# Patient Record
Sex: FEMALE | Race: BLACK OR AFRICAN AMERICAN | Employment: PART TIME | URBAN - METROPOLITAN AREA
[De-identification: names, ages, dates, MRNs, and addresses within clinical notes are randomized per-mention and may not be internally consistent; named-entity substitution may affect disease eponyms.]

---
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
## 2022-01-13 ENCOUNTER — HOSPITAL ENCOUNTER (OUTPATIENT)
Dept: NUCLEAR MEDICINE | Age: 22
Discharge: HOME | End: 2022-01-13
Attending: INTERNAL MEDICINE
Payer: COMMERCIAL

## 2022-01-13 DIAGNOSIS — R11.2 NAUSEA WITH VOMITING: ICD-10-CM

## 2022-01-13 PROCEDURE — 78264 GASTRIC EMPTYING IMG STUDY: CPT

## 2022-01-13 RX ORDER — TECHNETIUM TC 99M SULFUR COLLOID 2 MG
0.5 KIT MISCELLANEOUS
Status: COMPLETED | OUTPATIENT
Start: 2022-01-13 | End: 2022-01-13

## 2022-01-13 RX ADMIN — TECHNETIUM TC 99M SULFUR COLLOID 0.5 MILLICURIE: KIT at 11:30

## 2022-06-28 PROCEDURE — 93005 ELECTROCARDIOGRAM TRACING: CPT

## 2022-06-28 PROCEDURE — 84484 ASSAY OF TROPONIN QUANT: CPT

## 2022-06-28 PROCEDURE — 36415 COLL VENOUS BLD VENIPUNCTURE: CPT

## 2022-06-28 PROCEDURE — 80053 COMPREHEN METABOLIC PANEL: CPT

## 2022-06-28 PROCEDURE — 85025 COMPLETE CBC W/AUTO DIFF WBC: CPT

## 2022-06-28 PROCEDURE — 99285 EMERGENCY DEPT VISIT HI MDM: CPT

## 2022-06-28 PROCEDURE — 84703 CHORIONIC GONADOTROPIN ASSAY: CPT

## 2022-06-29 ENCOUNTER — APPOINTMENT (OUTPATIENT)
Dept: CT IMAGING | Age: 22
End: 2022-06-29
Attending: EMERGENCY MEDICINE
Payer: COMMERCIAL

## 2022-06-29 ENCOUNTER — HOSPITAL ENCOUNTER (EMERGENCY)
Age: 22
Discharge: HOME | End: 2022-06-29
Attending: EMERGENCY MEDICINE
Payer: COMMERCIAL

## 2022-06-29 ENCOUNTER — APPOINTMENT (OUTPATIENT)
Dept: GENERAL RADIOLOGY | Age: 22
End: 2022-06-29
Attending: EMERGENCY MEDICINE
Payer: COMMERCIAL

## 2022-06-29 VITALS
WEIGHT: 135 LBS | HEART RATE: 61 BPM | HEIGHT: 67 IN | TEMPERATURE: 97.7 F | OXYGEN SATURATION: 97 % | DIASTOLIC BLOOD PRESSURE: 69 MMHG | BODY MASS INDEX: 21.19 KG/M2 | RESPIRATION RATE: 15 BRPM | SYSTOLIC BLOOD PRESSURE: 109 MMHG

## 2022-06-29 DIAGNOSIS — R07.9 ACUTE CHEST PAIN: ICD-10-CM

## 2022-06-29 DIAGNOSIS — R07.89 ATYPICAL CHEST PAIN: Primary | ICD-10-CM

## 2022-06-29 DIAGNOSIS — N39.0 URINARY TRACT INFECTION WITHOUT HEMATURIA, SITE UNSPECIFIED: ICD-10-CM

## 2022-06-29 DIAGNOSIS — M79.18 MUSCULAR ABDOMINAL PAIN IN LEFT FLANK: ICD-10-CM

## 2022-06-29 LAB
ALBUMIN SERPL-MCNC: 4.6 G/DL (ref 3.5–5)
ALBUMIN/GLOB SERPL: 1.2 {RATIO} (ref 1.1–2.2)
ALP SERPL-CCNC: 57 U/L (ref 45–117)
ALT SERPL-CCNC: 12 U/L (ref 12–78)
ANION GAP SERPL CALC-SCNC: 7 MMOL/L (ref 5–15)
APPEARANCE UR: CLEAR
AST SERPL-CCNC: 12 U/L (ref 15–37)
ATRIAL RATE: 87 BPM
BACTERIA URNS QL MICRO: ABNORMAL /HPF
BASOPHILS # BLD: 0 K/UL (ref 0–0.1)
BASOPHILS NFR BLD: 0 % (ref 0–1)
BILIRUB SERPL-MCNC: 0.9 MG/DL (ref 0.2–1)
BILIRUB UR QL: NEGATIVE
BUN SERPL-MCNC: 7 MG/DL (ref 6–20)
BUN/CREAT SERPL: 7 (ref 12–20)
CALCIUM SERPL-MCNC: 9.3 MG/DL (ref 8.5–10.1)
CALCULATED P AXIS, ECG09: 63 DEGREES
CALCULATED R AXIS, ECG10: 81 DEGREES
CALCULATED T AXIS, ECG11: 46 DEGREES
CHLORIDE SERPL-SCNC: 104 MMOL/L (ref 97–108)
CO2 SERPL-SCNC: 27 MMOL/L (ref 21–32)
COLOR UR: ABNORMAL
CREAT SERPL-MCNC: 0.94 MG/DL (ref 0.55–1.02)
DIAGNOSIS, 93000: NORMAL
DIFFERENTIAL METHOD BLD: ABNORMAL
EOSINOPHIL # BLD: 0.1 K/UL (ref 0–0.4)
EOSINOPHIL NFR BLD: 1 % (ref 0–7)
EPITH CASTS URNS QL MICRO: ABNORMAL /LPF
ERYTHROCYTE [DISTWIDTH] IN BLOOD BY AUTOMATED COUNT: 12.5 % (ref 11.5–14.5)
GLOBULIN SER CALC-MCNC: 3.8 G/DL (ref 2–4)
GLUCOSE SERPL-MCNC: 85 MG/DL (ref 65–100)
GLUCOSE UR STRIP.AUTO-MCNC: NEGATIVE MG/DL
HCG SERPL QL: NEGATIVE
HCG UR QL: NEGATIVE
HCT VFR BLD AUTO: 37.1 % (ref 35–47)
HGB BLD-MCNC: 12.6 G/DL (ref 11.5–16)
HGB UR QL STRIP: ABNORMAL
IMM GRANULOCYTES # BLD AUTO: 0 K/UL (ref 0–0.04)
IMM GRANULOCYTES NFR BLD AUTO: 1 % (ref 0–0.5)
KETONES UR QL STRIP.AUTO: NEGATIVE MG/DL
LEUKOCYTE ESTERASE UR QL STRIP.AUTO: ABNORMAL
LYMPHOCYTES # BLD: 1.7 K/UL (ref 0.8–3.5)
LYMPHOCYTES NFR BLD: 21 % (ref 12–49)
MCH RBC QN AUTO: 29 PG (ref 26–34)
MCHC RBC AUTO-ENTMCNC: 34 G/DL (ref 30–36.5)
MCV RBC AUTO: 85.3 FL (ref 80–99)
MONOCYTES # BLD: 0.8 K/UL (ref 0–1)
MONOCYTES NFR BLD: 10 % (ref 5–13)
MUCOUS THREADS URNS QL MICRO: ABNORMAL /LPF
NEUTS SEG # BLD: 5.3 K/UL (ref 1.8–8)
NEUTS SEG NFR BLD: 67 % (ref 32–75)
NITRITE UR QL STRIP.AUTO: NEGATIVE
NRBC # BLD: 0 K/UL (ref 0–0.01)
NRBC BLD-RTO: 0 PER 100 WBC
P-R INTERVAL, ECG05: 130 MS
PH UR STRIP: 5.5 [PH] (ref 5–8)
PLATELET # BLD AUTO: 251 K/UL (ref 150–400)
PMV BLD AUTO: 10.6 FL (ref 8.9–12.9)
POTASSIUM SERPL-SCNC: 3.4 MMOL/L (ref 3.5–5.1)
PROT SERPL-MCNC: 8.4 G/DL (ref 6.4–8.2)
PROT UR STRIP-MCNC: NEGATIVE MG/DL
Q-T INTERVAL, ECG07: 360 MS
QRS DURATION, ECG06: 78 MS
QTC CALCULATION (BEZET), ECG08: 433 MS
RBC # BLD AUTO: 4.35 M/UL (ref 3.8–5.2)
RBC #/AREA URNS HPF: ABNORMAL /HPF (ref 0–5)
SODIUM SERPL-SCNC: 138 MMOL/L (ref 136–145)
SP GR UR REFRACTOMETRY: 1.02
TROPONIN-HIGH SENSITIVITY: <4 NG/L (ref 0–51)
UA: UC IF INDICATED,UAUC: ABNORMAL
UROBILINOGEN UR QL STRIP.AUTO: 1 EU/DL (ref 0.2–1)
VENTRICULAR RATE, ECG03: 87 BPM
WBC # BLD AUTO: 7.8 K/UL (ref 3.6–11)
WBC URNS QL MICRO: ABNORMAL /HPF (ref 0–4)

## 2022-06-29 PROCEDURE — 96375 TX/PRO/DX INJ NEW DRUG ADDON: CPT

## 2022-06-29 PROCEDURE — 74011000636 HC RX REV CODE- 636: Performed by: EMERGENCY MEDICINE

## 2022-06-29 PROCEDURE — 81025 URINE PREGNANCY TEST: CPT

## 2022-06-29 PROCEDURE — 81001 URINALYSIS AUTO W/SCOPE: CPT

## 2022-06-29 PROCEDURE — 71046 X-RAY EXAM CHEST 2 VIEWS: CPT

## 2022-06-29 PROCEDURE — 74177 CT ABD & PELVIS W/CONTRAST: CPT

## 2022-06-29 PROCEDURE — 96374 THER/PROPH/DIAG INJ IV PUSH: CPT

## 2022-06-29 PROCEDURE — 74011250636 HC RX REV CODE- 250/636: Performed by: EMERGENCY MEDICINE

## 2022-06-29 PROCEDURE — 73502 X-RAY EXAM HIP UNI 2-3 VIEWS: CPT

## 2022-06-29 RX ORDER — MORPHINE SULFATE 2 MG/ML
4 INJECTION, SOLUTION INTRAMUSCULAR; INTRAVENOUS
Status: COMPLETED | OUTPATIENT
Start: 2022-06-29 | End: 2022-06-29

## 2022-06-29 RX ORDER — NAPROXEN 500 MG/1
500 TABLET ORAL 2 TIMES DAILY WITH MEALS
Qty: 20 TABLET | Refills: 0 | Status: SHIPPED | OUTPATIENT
Start: 2022-06-29 | End: 2022-08-22

## 2022-06-29 RX ORDER — NITROFURANTOIN 25; 75 MG/1; MG/1
100 CAPSULE ORAL 2 TIMES DAILY
Qty: 10 CAPSULE | Refills: 0 | Status: SHIPPED | OUTPATIENT
Start: 2022-06-29 | End: 2022-07-04

## 2022-06-29 RX ORDER — KETOROLAC TROMETHAMINE 30 MG/ML
30 INJECTION, SOLUTION INTRAMUSCULAR; INTRAVENOUS
Status: COMPLETED | OUTPATIENT
Start: 2022-06-29 | End: 2022-06-29

## 2022-06-29 RX ORDER — METHOCARBAMOL 500 MG/1
500 TABLET, FILM COATED ORAL 3 TIMES DAILY
Qty: 15 TABLET | Refills: 0 | Status: SHIPPED | OUTPATIENT
Start: 2022-06-29 | End: 2022-08-22 | Stop reason: ALTCHOICE

## 2022-06-29 RX ADMIN — KETOROLAC TROMETHAMINE 30 MG: 30 INJECTION, SOLUTION INTRAMUSCULAR at 01:35

## 2022-06-29 RX ADMIN — IOPAMIDOL 100 ML: 755 INJECTION, SOLUTION INTRAVENOUS at 02:55

## 2022-06-29 RX ADMIN — MORPHINE SULFATE 4 MG: 2 INJECTION, SOLUTION INTRAMUSCULAR; INTRAVENOUS at 02:37

## 2022-06-29 NOTE — Clinical Note
[address]  [facility] EMERGENCY DEPT  [id number] [id number] [phone]    Work/School Note    Date: 6/28/2022    To Whom It May concern:    Shannan Zhang was seen and treated today in the emergency room by the following provider(s):  Attending Provider: Michel Pascal MD.      Shannan Zhang is excused from work/school on 6/29/2022 through 7/1/2022. She is medically clear to return to work/school on 7/2/2022.          Sincerely,          Kate Nelson MD

## 2022-06-29 NOTE — DISCHARGE INSTRUCTIONS
Thank You! It was a pleasure taking care of you in our Emergency Department today. We know that when you come to [facility], you are entrusting us with your health, comfort, and safety. Our physicians and nurses honor that trust, and truly appreciate the opportunity to care for you and your loved ones. We also value your feedback. If you receive a survey about your Emergency Department experience today, please fill it out. We care about our patients' feedback, and we listen to what you have to say. Thank you. Dr. Sophie Lord M.D.      ________________________________________________________________________  I have included a copy of your lab results and/or radiologic studies from today's visit so you can have them easily available at your follow-up visit. We hope you feel better and please do not hesitate to contact the ED if you have any questions at all!     Recent Results (from the past 12 hour(s))   EKG, 12 LEAD, INITIAL    Collection Time: 06/28/22 11:40 PM   Result Value Ref Range    Ventricular Rate 87 BPM    Atrial Rate 87 BPM    P-R Interval 130 ms    QRS Duration 78 ms    Q-T Interval 360 ms    QTC Calculation (Bezet) 433 ms    Calculated P Axis 63 degrees    Calculated R Axis 81 degrees    Calculated T Axis 46 degrees    Diagnosis       ** Poor data quality, interpretation may be adversely affected  Normal sinus rhythm with sinus arrhythmia  Normal ECG  When compared with ECG of 02-SEP-2021 17:00,  No significant change was found     CBC WITH AUTOMATED DIFF    Collection Time: 06/28/22 11:47 PM   Result Value Ref Range    WBC 7.8 3.6 - 11.0 K/uL    RBC 4.35 3.80 - 5.20 M/uL    HGB 12.6 11.5 - 16.0 g/dL    HCT 37.1 35.0 - 47.0 %    MCV 85.3 80.0 - 99.0 FL    MCH 29.0 26.0 - 34.0 PG    MCHC 34.0 30.0 - 36.5 g/dL    RDW 12.5 11.5 - 14.5 %    PLATELET 207 352 - 795 K/uL    MPV 10.6 8.9 - 12.9 FL    NRBC 0.0 0  WBC    ABSOLUTE NRBC 0.00 0.00 - 0.01 K/uL NEUTROPHILS 67 32 - 75 %    LYMPHOCYTES 21 12 - 49 %    MONOCYTES 10 5 - 13 %    EOSINOPHILS 1 0 - 7 %    BASOPHILS 0 0 - 1 %    IMMATURE GRANULOCYTES 1 (H) 0.0 - 0.5 %    ABS. NEUTROPHILS 5.3 1.8 - 8.0 K/UL    ABS. LYMPHOCYTES 1.7 0.8 - 3.5 K/UL    ABS. MONOCYTES 0.8 0.0 - 1.0 K/UL    ABS. EOSINOPHILS 0.1 0.0 - 0.4 K/UL    ABS. BASOPHILS 0.0 0.0 - 0.1 K/UL    ABS. IMM. GRANS. 0.0 0.00 - 0.04 K/UL    DF AUTOMATED     METABOLIC PANEL, COMPREHENSIVE    Collection Time: 06/28/22 11:47 PM   Result Value Ref Range    Sodium 138 136 - 145 mmol/L    Potassium 3.4 (L) 3.5 - 5.1 mmol/L    Chloride 104 97 - 108 mmol/L    CO2 27 21 - 32 mmol/L    Anion gap 7 5 - 15 mmol/L    Glucose 85 65 - 100 mg/dL    BUN 7 6 - 20 MG/DL    Creatinine 0.94 0.55 - 1.02 MG/DL    BUN/Creatinine ratio 7 (L) 12 - 20      GFR est AA >60 >60 ml/min/1.73m2    GFR est non-AA >60 >60 ml/min/1.73m2    Calcium 9.3 8.5 - 10.1 MG/DL    Bilirubin, total 0.9 0.2 - 1.0 MG/DL    ALT (SGPT) 12 12 - 78 U/L    AST (SGOT) 12 (L) 15 - 37 U/L    Alk.  phosphatase 57 45 - 117 U/L    Protein, total 8.4 (H) 6.4 - 8.2 g/dL    Albumin 4.6 3.5 - 5.0 g/dL    Globulin 3.8 2.0 - 4.0 g/dL    A-G Ratio 1.2 1.1 - 2.2     TROPONIN-HIGH SENSITIVITY    Collection Time: 06/28/22 11:47 PM   Result Value Ref Range    Troponin-High Sensitivity <4 0 - 51 ng/L   HCG QL SERUM    Collection Time: 06/28/22 11:47 PM   Result Value Ref Range    HCG, Ql. Negative NEG     URINALYSIS W/ REFLEX CULTURE    Collection Time: 06/29/22  1:14 AM    Specimen: Urine   Result Value Ref Range    Color YELLOW/STRAW      Appearance CLEAR CLEAR      Specific gravity 1.017      pH (UA) 5.5 5.0 - 8.0      Protein Negative NEG mg/dL    Glucose Negative NEG mg/dL    Ketone Negative NEG mg/dL    Bilirubin Negative NEG      Blood TRACE (A) NEG      Urobilinogen 1.0 0.2 - 1.0 EU/dL    Nitrites Negative NEG      Leukocyte Esterase SMALL (A) NEG      WBC 0-4 0 - 4 /hpf    RBC 0-5 0 - 5 /hpf    Epithelial cells MANY (A) FEW /lpf    Bacteria 1+ (A) NEG /hpf    UA:UC IF INDICATED CULTURE NOT INDICATED BY UA RESULT CNI      Mucus 1+ (A) NEG /lpf   HCG URINE, QL. - POC    Collection Time: 06/29/22  1:30 AM   Result Value Ref Range    Pregnancy test,urine (POC) Negative NEG         CT ABD PELV W CONT   Final Result   Small amount of free fluid in the pelvis. No bowel obstruction. No   intra-abdominal abscess. XR HIP LT W OR WO PELV 2-3 VWS   Final Result   No evidence of acute fracture or dislocation. XR CHEST PA LAT   Final Result   No acute cardiopulmonary disease. CT Results  (Last 48 hours)                 06/29/22 0254  CT ABD PELV W CONT Final result    Impression:  Small amount of free fluid in the pelvis. No bowel obstruction. No   intra-abdominal abscess. Narrative:  INDICATION: Acute left lower quadrant abdominal pain. CT of the abdomen and pelvis is performed with 5 mm collimation. Study is   performed with 100 cc of nonionic Isovue 370. Sagittal and coronal reformatted   images were also performed. CT dose reduction was achieved with the use of the standardized protocol   tailored for this examination and automatic exposure control for dose   modulation. Direct comparison is made to prior CT dated June 2021. Findings:       Lung bases: The visualized lung bases are clear. Liver: The liver is normal.       Adrenals: Adrenal glands are normal.       Pancreas: The pancreas is normal.       Gallbladder: The gallbladder is normal.       Kidneys: The kidneys are normal.       Spleen: The spleen is normal.       Lymph nodes. There is no saw hepatitis, mesenteric, retroperitoneal or pelvic   lymphadenopathy. Bowel: No thickened or dilated loop of large or small bowel is visualized. Appendix: The appendix is normal.       Urinary bladder: Urinary bladder is partially filled and grossly normal.       Miscellaneous:  There is a small amount of free fluid in the pelvis. There is no   free intraperitoneal gas. There is no focal fluid collection to suggest abscess. The exam and treatment you received in the Emergency Department were for an urgent problem and are not intended as complete care. It is important that you follow up with a doctor, nurse practitioner, or physician assistant for ongoing care. If your symptoms become worse or you do not improve as expected and you are unable to reach your usual health care provider, you should return to the Emergency Department. We are available 24 hours a day. Please take your discharge instructions with you when you go to your follow-up appointment. If a prescription has been provided, please have it filled as soon as possible to prevent a delay in treatment. Read the entire medication instruction sheet provided to you by the pharmacy. If you have any questions or reservations about taking the medication due to side effects or interactions with other medications, please call your primary care physician or contact the ER to speak with the charge nurse. Please make an appointment with your family doctor or the physician you were referred to for follow-up of this visit as instructed on your discharge paperwork. Return to the ER if you are unable to be seen or if you are unable to be seen in a timely manner. If you have any problem arranging the follow-up visit, contact the Emergency Department immediately.

## 2022-06-29 NOTE — ED PROVIDER NOTES
EMERGENCY DEPARTMENT HISTORY AND PHYSICAL EXAM      Please note that this dictation was completed with the assistance of \"Dragon\", the computer voice recognition software. Quite often unanticipated grammatical, syntax, homophones, and other interpretive errors are inadvertently transcribed by the computer software. Please disregard these errors and any errors that have escaped final proofreading. Thank you. Date: 06/29/22  Patient: Lucho Saunders  Patient Age and Sex: 23 y.o. female   MRN: [id number]  CSN: [id number]    History of Presenting Illness     Chief Complaint   Patient presents with    Hip Pain     Patient states she was in a patient's room and experienced L sided hip pain, shooting pain increased and has not improved since onset. Patient states that it is painful to ambulate. Patient experiencing headache and chest pain that began with symptom onset.  Chest Pain     History Provided By: Patient/family/EMS (if applicable)    HPI: Lucho Saunders, 23 y.o. female with past medical history as documented below presents to the ED with c/o of acute onset of mild to moderate left sided chest and hip pain about an hour ago. 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 Reports no other associated sx's. States sx's are sharp, worse with movement and palpation. Pt denies any other exacerbating or ameliorating factors. Additionally, pt specifically denies any recent fever, chills, headache, nausea, vomiting, abdominal pain, SOB, lightheadedness, dizziness, numbness, weakness, lower extremity swelling, heart palpitations, urinary sxs, diarrhea, constipation, melena, hematochezia, cough, or congestion. There are no other complaints, changes or physical findings pertinent to the HPI at this time.     PCP: Milena Miguel MD  Past History   Past Medical History:  Denies    Past Surgical History:  Past Surgical History:   Procedure Laterality Date    HX ORTHOPAEDIC right ACL    HX TYMPANOSTOMY         Family History:   Family history reviewed and was non-contributory, unless specified below:    Social History:  Social History     Tobacco Use    Smoking status: Never Smoker    Smokeless tobacco: Never Used   Substance Use Topics    Alcohol use: Not on file    Drug use: Not on file       Allergies: Allergies   Allergen Reactions    Pcn [Penicillins] Rash       Current Medications:  No current facility-administered medications on file prior to encounter. Current Outpatient Medications on File Prior to Encounter   Medication Sig Dispense Refill    ondansetron (Zofran ODT) 4 mg disintegrating tablet Take 1 Tab by mouth every eight (8) hours as needed for Nausea. 10 Tab 0    HYDROcodone-acetaminophen (NORCO) 5-325 mg per tablet Take 1 Tab by mouth every six (6) hours as needed for Pain. Max Daily Amount: 4 Tabs. 8 Tab 0    ibuprofen (MOTRIN) 600 mg tablet Take 1 Tab by mouth every six (6) hours as needed for Pain. 20 Tab 0    montelukast (SINGULAIR) 5 mg chewable tablet   0    cyclobenzaprine (FLEXERIL) 10 mg tablet   0     Review of Systems   A complete ROS was reviewed by me today and all other systems negative, unless otherwise specified below:  Review of Systems   Constitutional: Negative. Negative for chills and fever. HENT: Negative. Negative for congestion and sore throat. Eyes: Negative. Respiratory: Negative. Negative for cough, chest tightness, shortness of breath and wheezing. Cardiovascular: Positive for chest pain. Negative for palpitations and leg swelling. Gastrointestinal: Negative. Negative for abdominal distention, abdominal pain, blood in stool, constipation, diarrhea, nausea and vomiting. Endocrine: Negative. Genitourinary: Negative. Negative for dysuria, flank pain, frequency, hematuria and urgency. Musculoskeletal: Positive for arthralgias. Negative for back pain and myalgias. Skin: Negative.   Negative for color change and rash. Neurological: Negative. Negative for dizziness, syncope, speech difficulty, weakness, light-headedness, numbness and headaches. Hematological: Negative. Psychiatric/Behavioral: Negative. Negative for confusion and self-injury. The patient is not nervous/anxious. All other systems reviewed and are negative. Physical Exam   Physical Exam  Vitals and nursing note reviewed. Constitutional:       General: She is not in acute distress. Appearance: She is well-developed. She is not diaphoretic. HENT:      Head: Normocephalic and atraumatic. Mouth/Throat:      Pharynx: No oropharyngeal exudate. Eyes:      Conjunctiva/sclera: Conjunctivae normal.      Pupils: Pupils are equal, round, and reactive to light. Cardiovascular:      Rate and Rhythm: Normal rate and regular rhythm. Heart sounds: Normal heart sounds. No murmur heard. No friction rub. No gallop. Pulmonary:      Effort: Pulmonary effort is normal. No respiratory distress. Breath sounds: Normal breath sounds. No wheezing or rales. Chest:      Chest wall: Tenderness (reproducible chest wall TTP) present. Abdominal:      General: Bowel sounds are normal. There is no distension. Palpations: Abdomen is soft. There is no mass. Tenderness: There is no abdominal tenderness. There is no guarding or rebound. Musculoskeletal:         General: No tenderness or deformity. Normal range of motion. Cervical back: Normal range of motion. Skin:     General: Skin is warm. Findings: No rash. Neurological:      Mental Status: She is alert and oriented to person, place, and time. Cranial Nerves: No cranial nerve deficit. Motor: No abnormal muscle tone. Coordination: Coordination normal.      Deep Tendon Reflexes: Reflexes normal.       Diagnostic Study Results     Laboratory Data  I have personally reviewed and interpreted all available laboratory results.    Recent Results (from the past 24 hour(s))   EKG, 12 LEAD, INITIAL    Collection Time: 06/28/22 11:40 PM   Result Value Ref Range    Ventricular Rate 87 BPM    Atrial Rate 87 BPM    P-R Interval 130 ms    QRS Duration 78 ms    Q-T Interval 360 ms    QTC Calculation (Bezet) 433 ms    Calculated P Axis 63 degrees    Calculated R Axis 81 degrees    Calculated T Axis 46 degrees    Diagnosis       ** Poor data quality, interpretation may be adversely affected  Normal sinus rhythm with sinus arrhythmia  Normal ECG  When compared with ECG of 02-SEP-2021 17:00,  No significant change was found     CBC WITH AUTOMATED DIFF    Collection Time: 06/28/22 11:47 PM   Result Value Ref Range    WBC 7.8 3.6 - 11.0 K/uL    RBC 4.35 3.80 - 5.20 M/uL    HGB 12.6 11.5 - 16.0 g/dL    HCT 37.1 35.0 - 47.0 %    MCV 85.3 80.0 - 99.0 FL    MCH 29.0 26.0 - 34.0 PG    MCHC 34.0 30.0 - 36.5 g/dL    RDW 12.5 11.5 - 14.5 %    PLATELET 330 939 - 752 K/uL    MPV 10.6 8.9 - 12.9 FL    NRBC 0.0 0  WBC    ABSOLUTE NRBC 0.00 0.00 - 0.01 K/uL    NEUTROPHILS 67 32 - 75 %    LYMPHOCYTES 21 12 - 49 %    MONOCYTES 10 5 - 13 %    EOSINOPHILS 1 0 - 7 %    BASOPHILS 0 0 - 1 %    IMMATURE GRANULOCYTES 1 (H) 0.0 - 0.5 %    ABS. NEUTROPHILS 5.3 1.8 - 8.0 K/UL    ABS. LYMPHOCYTES 1.7 0.8 - 3.5 K/UL    ABS. MONOCYTES 0.8 0.0 - 1.0 K/UL    ABS. EOSINOPHILS 0.1 0.0 - 0.4 K/UL    ABS. BASOPHILS 0.0 0.0 - 0.1 K/UL    ABS. IMM.  GRANS. 0.0 0.00 - 0.04 K/UL    DF AUTOMATED     METABOLIC PANEL, COMPREHENSIVE    Collection Time: 06/28/22 11:47 PM   Result Value Ref Range    Sodium 138 136 - 145 mmol/L    Potassium 3.4 (L) 3.5 - 5.1 mmol/L    Chloride 104 97 - 108 mmol/L    CO2 27 21 - 32 mmol/L    Anion gap 7 5 - 15 mmol/L    Glucose 85 65 - 100 mg/dL    BUN 7 6 - 20 MG/DL    Creatinine 0.94 0.55 - 1.02 MG/DL    BUN/Creatinine ratio 7 (L) 12 - 20      GFR est AA >60 >60 ml/min/1.73m2    GFR est non-AA >60 >60 ml/min/1.73m2    Calcium 9.3 8.5 - 10.1 MG/DL    Bilirubin, total 0.9 0.2 - 1.0 MG/DL    ALT (SGPT) 12 12 - 78 U/L    AST (SGOT) 12 (L) 15 - 37 U/L    Alk. phosphatase 57 45 - 117 U/L    Protein, total 8.4 (H) 6.4 - 8.2 g/dL    Albumin 4.6 3.5 - 5.0 g/dL    Globulin 3.8 2.0 - 4.0 g/dL    A-G Ratio 1.2 1.1 - 2.2     TROPONIN-HIGH SENSITIVITY    Collection Time: 06/28/22 11:47 PM   Result Value Ref Range    Troponin-High Sensitivity <4 0 - 51 ng/L   HCG QL SERUM    Collection Time: 06/28/22 11:47 PM   Result Value Ref Range    HCG, Ql. Negative NEG     URINALYSIS W/ REFLEX CULTURE    Collection Time: 06/29/22  1:14 AM    Specimen: Urine   Result Value Ref Range    Color YELLOW/STRAW      Appearance CLEAR CLEAR      Specific gravity 1.017      pH (UA) 5.5 5.0 - 8.0      Protein Negative NEG mg/dL    Glucose Negative NEG mg/dL    Ketone Negative NEG mg/dL    Bilirubin Negative NEG      Blood TRACE (A) NEG      Urobilinogen 1.0 0.2 - 1.0 EU/dL    Nitrites Negative NEG      Leukocyte Esterase SMALL (A) NEG      WBC 0-4 0 - 4 /hpf    RBC 0-5 0 - 5 /hpf    Epithelial cells MANY (A) FEW /lpf    Bacteria 1+ (A) NEG /hpf    UA:UC IF INDICATED CULTURE NOT INDICATED BY UA RESULT CNI      Mucus 1+ (A) NEG /lpf   HCG URINE, QL. - POC    Collection Time: 06/29/22  1:30 AM   Result Value Ref Range    Pregnancy test,urine (POC) Negative NEG         Radiologic Studies   I have personally reviewed and interpreted all available imaging studies and agree with radiology interpretation. CT ABD PELV W CONT   Final Result   Small amount of free fluid in the pelvis. No bowel obstruction. No   intra-abdominal abscess. XR HIP LT W OR WO PELV 2-3 VWS   Final Result   No evidence of acute fracture or dislocation. XR CHEST PA LAT   Final Result   No acute cardiopulmonary disease. CT Results  (Last 48 hours)               06/29/22 0254  CT ABD PELV W CONT Final result    Impression:  Small amount of free fluid in the pelvis. No bowel obstruction. No   intra-abdominal abscess.        Narrative:  INDICATION: Acute left lower quadrant abdominal pain. CT of the abdomen and pelvis is performed with 5 mm collimation. Study is   performed with 100 cc of nonionic Isovue 370. Sagittal and coronal reformatted   images were also performed. CT dose reduction was achieved with the use of the standardized protocol   tailored for this examination and automatic exposure control for dose   modulation. Direct comparison is made to prior CT dated June 2021. Findings:       Lung bases: The visualized lung bases are clear. Liver: The liver is normal.       Adrenals: Adrenal glands are normal.       Pancreas: The pancreas is normal.       Gallbladder: The gallbladder is normal.       Kidneys: The kidneys are normal.       Spleen: The spleen is normal.       Lymph nodes. There is no saw hepatitis, mesenteric, retroperitoneal or pelvic   lymphadenopathy. Bowel: No thickened or dilated loop of large or small bowel is visualized. Appendix: The appendix is normal.       Urinary bladder: Urinary bladder is partially filled and grossly normal.       Miscellaneous: There is a small amount of free fluid in the pelvis. There is no   free intraperitoneal gas. There is no focal fluid collection to suggest abscess. CXR Results  (Last 48 hours)               06/29/22 0154  XR CHEST PA LAT Final result    Impression:  No acute cardiopulmonary disease. Narrative: Indication:  Chest pain        Exam: PA and lateral views of the chest.       Direct comparison is made to prior CXR dated September 2021. Findings: Cardiomediastinal silhouette is within normal limits. Lungs are clear   bilaterally. Pleural spaces are normal. Osseous structures are intact. Medical Decision Making   I am the first and primary ED physician for this patient's ED visit today.     I reviewed our electronic medical record system for any past medical records that may contribute to the patient's current condition, including their past medical history, surgical history, social and family history. This also includes their most recent ED visits, previous hospitalizations and prior diagnostic data. I have reviewed and summarized the most pertinent findings in my HPI and MDM. Vital Signs Reviewed:  Patient Vitals for the past 24 hrs:   Temp Pulse Resp BP SpO2   06/29/22 0436 -- -- -- -- 97 %   06/29/22 0405 -- -- -- 109/69 --   06/29/22 0235 -- 61 15 113/75 97 %   06/29/22 0150 -- 70 10 129/82 98 %   06/28/22 2336 97.7 °F (36.5 °C) 87 20 134/79 100 %     Pulse Oximetry Analysis: 100% on RA    Cardiac Monitor:   Rate: 87 bpm  The cardiac monitor revealed the following rhythm as interpreted by me: Normal Sinus Rhythm  Cardiac monitoring was ordered to monitor patient for signs of cardiac dysrhythmia, which they are at risk for based on their history and/or risk for cardiovascular disease and/or metabolic abnormalities. EKG interpretation:   Billable EKG reviewed by ED Physician in the absence of a cardiologist: Yes  Rhythm: normal sinus rhythm; and regular . Rate (approx.): 87; Axis: normal; P wave: normal; QRS interval: normal ; ST/T wave: normal; Other findings: normal. This EKG was interpreted by Luisa Siddiqi MD    Records Reviewed: Nursing Notes, Old Medical Records, Previous electrocardiograms, Previous Radiology Studies and Previous Laboratory Studies, EMS reports    Provider Notes (Medical Decision Making):   DDx includes STEMI, NSTEMI, Angina, PE, Aortic Pathology, Chest Wall Pain, Pleurisy, Pneumonia, GERD/esophagitis, Anxiety. No cough/fever or focal lung findings to suggest pneumonia. No tachycardia, hypoxia or pleuritic component to suggest PE. Pulses symmetric and no extremely elevated BP/asymmetry or classic tearing sensation to suggest Aortic Dissection. Also, no neuro findings. No wretching/forceful vomiting to suggest esophageal disaster.   Denies IV drug abuse, has native valves, no fevers/murmurs or skin lesions to suggest endocarditis. Will evaluate with EKG, labs, cardiac enzymes, chest x-ray. Will provide pain control and reassess. Patient presents with left hip pain after trauma. DDx: dislocation, fracture, contusion. Will provide ice, analgesics and xrays. Neurovascularly intact on exam. Will reassess. Will recommend RICE therapy, pain control. Follow up with PMD and ortho as needed. Discussed return precautions; pt agrees to above plan. I have also conveyed that they will be following up with an orthopedist who will continue with the next phase of their care. I have explained how very important it is for the patient to follow-up with this next phase as it may include further casting and/or surgery. ED Course:   Initial assessment performed. I discussed presenting problems and concerns, and my formulated plan for today's visit with the patient and any available family members. I have encouraged them to ask questions as they arise throughout the visit.    Social History     Tobacco Use    Smoking status: Never Smoker    Smokeless tobacco: Never Used   Substance Use Topics    Alcohol use: Not on file    Drug use: Not on file       ED Orders Placed:  Orders Placed This Encounter    XR HIP LT W OR WO PELV 2-3 VWS    XR CHEST PA LAT    CT ABD PELV W CONT    CBC WITH AUTOMATED DIFF    METABOLIC PANEL, COMPREHENSIVE    TROPONIN-HIGH SENSITIVITY    URINALYSIS W/ REFLEX CULTURE    HCG QL., SERUM    POC URINE PREGNANCY TEST    HCG URINE, QL. - POC    EKG, 12 LEAD, INITIAL    ketorolac (TORADOL) injection 30 mg    morphine injection 4 mg    iopamidoL (ISOVUE-370) 76 % injection 100 mL    iopamidoL (ISOVUE-370) 76 % injection 100 mL    naproxen (NAPROSYN) 500 mg tablet    nitrofurantoin, macrocrystal-monohydrate, (Macrobid) 100 mg capsule    methocarbamoL (ROBAXIN) 500 mg tablet       ED Medications Administered During ED Course:  Medications   iopamidoL (ISOVUE-370) 76 % injection 100 mL (has no administration in time range)   ketorolac (TORADOL) injection 30 mg (30 mg IntraVENous Given 6/29/22 0135)   morphine injection 4 mg (4 mg IntraVENous Given 6/29/22 0237)   iopamidoL (ISOVUE-370) 76 % injection 100 mL (100 mL IntraVENous Given 6/29/22 1415)        Progress Note:  I have just re-evaluated the patient. Patient reports improvement of sx's. I have reviewed Her vital signs and determined there is currently no worsening in their condition or physical exam. Results have been reviewed with them and their questions have been answered. We will continue to review further results as they come available. Progress Note:  Pt reassessed and symptoms noted to have improved significantly after ED treatment. Pt is clinically stable for discharge. Lissa Rubin's labs and imaging have been reviewed with her and available family. She verbally conveys understanding and agreement of the signs, symptoms, diagnosis, treatment and prognosis and additionally agrees to follow up as recommended with Dr. Lisette Jim MD and/or specialist as instructed. She agrees with the care plan we have created and conveys that all of her questions have been answered. Additionally, I have put together a packet of discharge instructions for her that include: 1) educational information regarding their diagnosis, 2) how to care for their diagnosis at home, as well a 3) list of reasons why they would want to return to the ED prior to their follow-up appointment should the patient's condition change or symptoms worsen. I have answered all questions to the patient's satisfaction. Strict return precautions given. She conveyed understanding and agreement with care plan. Vital signs stable for discharge. Disposition:  DISCHARGE  The pt is ready for discharge. The pt's signs, symptoms, diagnosis, and discharge instructions have been discussed and pt has conveyed their understanding.  The pt is to follow up as recommended or return to ER should their symptoms worsen. Plan has been discussed and pt is in agreement. Plan:  1. Return precautions as discussed with patient and available family/caregiver. 2.   Discharge Medication List as of 6/29/2022  4:32 AM      START taking these medications    Details   naproxen (NAPROSYN) 500 mg tablet Take 1 Tablet by mouth two (2) times daily (with meals). , Normal, Disp-20 Tablet, R-0      nitrofurantoin, macrocrystal-monohydrate, (Macrobid) 100 mg capsule Take 1 Capsule by mouth two (2) times a day for 5 days. , Normal, Disp-10 Capsule, R-0      methocarbamoL (ROBAXIN) 500 mg tablet Take 1 Tablet by mouth three (3) times daily. , Normal, Disp-15 Tablet, R-0         CONTINUE these medications which have NOT CHANGED    Details   ondansetron (Zofran ODT) 4 mg disintegrating tablet Take 1 Tab by mouth every eight (8) hours as needed for Nausea. , Print, Disp-10 Tab, R-0      HYDROcodone-acetaminophen (NORCO) 5-325 mg per tablet Take 1 Tab by mouth every six (6) hours as needed for Pain. Max Daily Amount: 4 Tabs., Print, Disp-8 Tab, R-0      ibuprofen (MOTRIN) 600 mg tablet Take 1 Tab by mouth every six (6) hours as needed for Pain., Print, Disp-20 Tab, R-0      montelukast (SINGULAIR) 5 mg chewable tablet Historical Med, R-0      cyclobenzaprine (FLEXERIL) 10 mg tablet Historical Med, R-0           3. Follow-up Information     Follow up With Specialties Details Why Contact Info    [facility] EMERGENCY DEPT Emergency Medicine  As needed, If symptoms worsen [address] [place] [address]  [phone]        Instructed to return to ED if worse  Diagnosis   Clinical Impression:  1. Atypical chest pain    2. Muscular abdominal pain in left flank    3. Urinary tract infection without hematuria, site unspecified    4. Acute chest pain      Attestation:  Nedra Franco MD, am the attending of record for this patient.  I personally performed the services described in this documentation on this date, 6/29/2022 for patient, Mauricio Fraga. I have reviewed the chart and verified that the record is accurate and complete.

## 2022-06-29 NOTE — ED NOTES
Discharge papers provided and explained. Pt verbalized understanding. Pt ambulatory to private vehicle upon discharge, no distress noted.

## 2022-08-22 ENCOUNTER — OFFICE VISIT (OUTPATIENT)
Dept: INTERNAL MEDICINE CLINIC | Age: 22
End: 2022-08-22
Payer: COMMERCIAL

## 2022-08-22 VITALS
BODY MASS INDEX: 21.5 KG/M2 | DIASTOLIC BLOOD PRESSURE: 65 MMHG | OXYGEN SATURATION: 98 % | TEMPERATURE: 98.4 F | HEART RATE: 76 BPM | WEIGHT: 137 LBS | HEIGHT: 67 IN | RESPIRATION RATE: 20 BRPM | SYSTOLIC BLOOD PRESSURE: 106 MMHG

## 2022-08-22 DIAGNOSIS — Z76.89 ENCOUNTER TO ESTABLISH CARE WITH NEW DOCTOR: Primary | ICD-10-CM

## 2022-08-22 DIAGNOSIS — Z11.59 NEED FOR HEPATITIS C SCREENING TEST: ICD-10-CM

## 2022-08-22 DIAGNOSIS — F41.9 ANXIETY: ICD-10-CM

## 2022-08-22 DIAGNOSIS — F32.A DEPRESSION, UNSPECIFIED DEPRESSION TYPE: ICD-10-CM

## 2022-08-22 DIAGNOSIS — L81.3 CAFE AU LAIT SPOTS: ICD-10-CM

## 2022-08-22 PROCEDURE — 99204 OFFICE O/P NEW MOD 45 MIN: CPT | Performed by: NURSE PRACTITIONER

## 2022-08-22 RX ORDER — HYDROXYZINE HYDROCHLORIDE 10 MG/1
TABLET, FILM COATED ORAL
COMMUNITY
Start: 2022-08-17 | End: 2022-10-17 | Stop reason: ALTCHOICE

## 2022-08-22 RX ORDER — MEDROXYPROGESTERONE ACETATE 150 MG/ML
150 INJECTION, SUSPENSION INTRAMUSCULAR
COMMUNITY

## 2022-08-22 RX ORDER — DULOXETIN HYDROCHLORIDE 20 MG/1
20 CAPSULE, DELAYED RELEASE ORAL DAILY
Qty: 30 CAPSULE | Refills: 1 | Status: SHIPPED | OUTPATIENT
Start: 2022-08-22 | End: 2022-10-21

## 2022-08-22 NOTE — PROGRESS NOTES
Lucho Saunders (: 2000) is a 23 y.o. female is here for evaluation of the following chief complaint(s): Establish Care (Previously with Roseanne Cummins MD - last saw in 2022) and Anxiety        Subjective/Objective:   Florian Harris was seen today for Establish Care (Previously with Roseanne Cummins MD - last saw in 2022) and Anxiety  Pt presents to the clinic to establish care and evaluation for her anxiety and depression. Pt states saw a therapist and was on Lexapro which did not help. Last saw the therapist 2022. Ran out of Lexapro shortly after. Pt reports emotions have been \"all over the place\" with bouts of crying. Pt denies SI HI or AVH. Pt sees GI for recurrent colitis. Last saw 2021 Pt does not have a Gyn and would like a referral. Pt also reports noticing cafe au lait spots in the last 2-3 months that her Aunt called \"stress spots\" No symptoms with these spots. Review of Systems   Constitutional: Negative. HENT: Negative. Eyes: Negative. Respiratory: Negative. Cardiovascular: Negative. Gastrointestinal: Negative. Genitourinary: Negative. Musculoskeletal: Negative. Skin:         Cafe au lait spots noted. Neurological: Negative. Endo/Heme/Allergies: Negative. Psychiatric/Behavioral:  Positive for depression. Negative for hallucinations, memory loss, substance abuse and suicidal ideas. The patient is nervous/anxious. The patient does not have insomnia. Physical Exam  Constitutional:       General: She is not in acute distress. Appearance: Normal appearance. She is normal weight. She is not ill-appearing. HENT:      Head: Normocephalic and atraumatic. Right Ear: Tympanic membrane, ear canal and external ear normal.      Left Ear: Tympanic membrane, ear canal and external ear normal.      Ears:      Comments: Denies difficulty hearing. Nose: Nose normal. No congestion. Mouth/Throat:      Lips: Pink.       Mouth: Mucous membranes are moist.      Pharynx: Oropharynx is clear. Comments: Denies any acute dental pain. Last saw dentist 2 years ago. Encouraged to make appt. Eyes:      General: Lids are normal.      Extraocular Movements: Extraocular movements intact. Conjunctiva/sclera: Conjunctivae normal.      Comments: Denies any acute vision problems. Last saw optho 1 year ago. Wears contacts. Neck:      Thyroid: No thyroid mass, thyromegaly or thyroid tenderness. Vascular: No carotid bruit or JVD. Trachea: Trachea and phonation normal.   Cardiovascular:      Rate and Rhythm: Normal rate and regular rhythm. Pulses:           Radial pulses are 2+ on the right side and 2+ on the left side. Heart sounds: Normal heart sounds, S1 normal and S2 normal.   Pulmonary:      Effort: Pulmonary effort is normal.      Breath sounds: Normal breath sounds and air entry. Abdominal:      General: Abdomen is flat. Bowel sounds are normal.      Palpations: Abdomen is soft. There is no hepatomegaly or splenomegaly. Tenderness: There is no abdominal tenderness. There is no right CVA tenderness or left CVA tenderness. Hernia: No hernia is present. Musculoskeletal:         General: Normal range of motion. Cervical back: Full passive range of motion without pain and normal range of motion. No spinous process tenderness or muscular tenderness. Right lower leg: No edema. Left lower leg: No edema. Lymphadenopathy:      Cervical: No cervical adenopathy. Skin:     General: Skin is warm and dry. Capillary Refill: Capillary refill takes less than 2 seconds. Comments: Cafe au lait spots noted. Pt states just appeared 2-3 months ago. Neurological:      General: No focal deficit present. Mental Status: She is alert and oriented to person, place, and time.    Psychiatric:         Attention and Perception: Attention and perception normal.         Mood and Affect: Mood is anxious and depressed. Behavior: Behavior normal.         Thought Content:  Thought content normal.         Cognition and Memory: Cognition and memory normal.         Judgment: Judgment normal.        /65 (BP 1 Location: Left arm, BP Patient Position: Sitting, BP Cuff Size: Adult)   Pulse 76   Temp 98.4 °F (36.9 °C) (Temporal)   Resp 20   Ht 5' 7\" (1.702 m)   Wt 137 lb (62.1 kg)   LMP 12/03/2021   SpO2 98%   BMI 21.46 kg/m²      Admission on 06/29/2022, Discharged on 06/29/2022   Component Date Value Ref Range Status    Ventricular Rate 06/28/2022 87  BPM Final    Atrial Rate 06/28/2022 87  BPM Final    P-R Interval 06/28/2022 130  ms Final    QRS Duration 06/28/2022 78  ms Final    Q-T Interval 06/28/2022 360  ms Final    QTC Calculation (Bezet) 06/28/2022 433  ms Final    Calculated P Axis 06/28/2022 63  degrees Final    Calculated R Axis 06/28/2022 81  degrees Final    Calculated T Axis 06/28/2022 46  degrees Final    Diagnosis 06/28/2022    Final                    Value:** Poor data quality, interpretation may be adversely affected  Normal sinus rhythm with sinus arrhythmia  Normal ECG     Confirmed by Mario Henriquez M.D. ([id number]) on 6/29/2022 12:06:36 PM      WBC 06/28/2022 7.8  3.6 - 11.0 K/uL Final    RBC 06/28/2022 4.35  3.80 - 5.20 M/uL Final    HGB 06/28/2022 12.6  11.5 - 16.0 g/dL Final    HCT 06/28/2022 37.1  35.0 - 47.0 % Final    MCV 06/28/2022 85.3  80.0 - 99.0 FL Final    MCH 06/28/2022 29.0  26.0 - 34.0 PG Final    MCHC 06/28/2022 34.0  30.0 - 36.5 g/dL Final    RDW 06/28/2022 12.5  11.5 - 14.5 % Final    PLATELET 03/70/9659 295  150 - 400 K/uL Final    MPV 06/28/2022 10.6  8.9 - 12.9 FL Final    NRBC 06/28/2022 0.0  0  WBC Final    ABSOLUTE NRBC 06/28/2022 0.00  0.00 - 0.01 K/uL Final    NEUTROPHILS 06/28/2022 67  32 - 75 % Final    LYMPHOCYTES 06/28/2022 21  12 - 49 % Final    MONOCYTES 06/28/2022 10  5 - 13 % Final    EOSINOPHILS 06/28/2022 1  0 - 7 % Final    BASOPHILS 06/28/2022 0  0 - 1 % Final    IMMATURE GRANULOCYTES 06/28/2022 1 (A) 0.0 - 0.5 % Final    ABS. NEUTROPHILS 06/28/2022 5.3  1.8 - 8.0 K/UL Final    ABS. LYMPHOCYTES 06/28/2022 1.7  0.8 - 3.5 K/UL Final    ABS. MONOCYTES 06/28/2022 0.8  0.0 - 1.0 K/UL Final    ABS. EOSINOPHILS 06/28/2022 0.1  0.0 - 0.4 K/UL Final    ABS. BASOPHILS 06/28/2022 0.0  0.0 - 0.1 K/UL Final    ABS. IMM. GRANS. 06/28/2022 0.0  0.00 - 0.04 K/UL Final    DF 06/28/2022 AUTOMATED    Final    Sodium 06/28/2022 138  136 - 145 mmol/L Final    Potassium 06/28/2022 3.4 (A) 3.5 - 5.1 mmol/L Final    Chloride 06/28/2022 104  97 - 108 mmol/L Final    CO2 06/28/2022 27  21 - 32 mmol/L Final    Anion gap 06/28/2022 7  5 - 15 mmol/L Final    Glucose 06/28/2022 85  65 - 100 mg/dL Final    BUN 06/28/2022 7  6 - 20 MG/DL Final    Creatinine 06/28/2022 0.94  0.55 - 1.02 MG/DL Final    BUN/Creatinine ratio 06/28/2022 7 (A) 12 - 20   Final    GFR est AA 06/28/2022 >60  >60 ml/min/1.73m2 Final    GFR est non-AA 06/28/2022 >60  >60 ml/min/1.73m2 Final    Estimated GFR is calculated using the IDMS-traceable Modification of Diet in Renal Disease (MDRD) Study equation, reported for both  Americans (GFRAA) and non- Americans (GFRNA), and normalized to 1.73m2 body surface area. The physician must decide which value applies to the patient. Calcium 06/28/2022 9.3  8.5 - 10.1 MG/DL Final    Bilirubin, total 06/28/2022 0.9  0.2 - 1.0 MG/DL Final    ALT (SGPT) 06/28/2022 12  12 - 78 U/L Final    AST (SGOT) 06/28/2022 12 (A) 15 - 37 U/L Final    Alk.  phosphatase 06/28/2022 57  45 - 117 U/L Final    Protein, total 06/28/2022 8.4 (A) 6.4 - 8.2 g/dL Final    Albumin 06/28/2022 4.6  3.5 - 5.0 g/dL Final    Globulin 06/28/2022 3.8  2.0 - 4.0 g/dL Final    A-G Ratio 06/28/2022 1.2  1.1 - 2.2   Final    Troponin-High Sensitivity 06/28/2022 <4  0 - 51 ng/L Final    Comment: A HS troponin value change of (+ or -) 50% or more below the 99th percentile, in a 1/2/3 hr interval represents a significant change. Clinical correcation is recommended. A HS troponin value change of (+ or -) 20% or above the 99th percentile, in a 1/2/3 hr interval represents a significant change. Clinical correlation is recommended. 99th Percentile:   Women: 0-51 ng/L                                                                Men:   0-76 ng/L      Color 06/29/2022 YELLOW/STRAW    Final    Color Reference Range: Straw, Yellow or Dark Yellow    Appearance 06/29/2022 CLEAR  CLEAR   Final    Specific gravity 06/29/2022 1.017    Final    pH (UA) 06/29/2022 5.5  5.0 - 8.0   Final    Protein 06/29/2022 Negative  NEG mg/dL Final    Glucose 06/29/2022 Negative  NEG mg/dL Final    Ketone 06/29/2022 Negative  NEG mg/dL Final    Bilirubin 06/29/2022 Negative  NEG   Final    Blood 06/29/2022 TRACE (A) NEG   Final    Urobilinogen 06/29/2022 1.0  0.2 - 1.0 EU/dL Final    Nitrites 06/29/2022 Negative  NEG   Final    Leukocyte Esterase 06/29/2022 SMALL (A) NEG   Final    WBC 06/29/2022 0-4  0 - 4 /hpf Final    RBC 06/29/2022 0-5  0 - 5 /hpf Final    Epithelial cells 06/29/2022 MANY (A) FEW /lpf Final    Epithelial cell category consists of squamous cells and /or transitional urothelial cells. Renal tubular cells, if present, are separately identified as such. Bacteria 06/29/2022 1+ (A) NEG /hpf Final    UA:UC IF INDICATED 06/29/2022 CULTURE NOT INDICATED BY UA RESULT  CNI   Final    Mucus 06/29/2022 1+ (A) NEG /lpf Final    HCG, Ql. 06/28/2022 Negative  NEG   Final    The serum pregnancy test becomes positive at about the time of implantation of the conceptus and approximates a quantitative bHCG value of >5 mIU/ML. Pregnancy test,urine (POC) 06/29/2022 Negative  NEG   Final         Past Surgical History:   Procedure Laterality Date    HX ORTHOPAEDIC      right ACL    HX TYMPANOSTOMY          No past medical history on file.      Current Outpatient Medications   Medication Instructions    cyclobenzaprine (FLEXERIL) 10 mg tablet No dose, route, or frequency recorded. DULoxetine (CYMBALTA) 20 mg, Oral, DAILY    HYDROcodone-acetaminophen (NORCO) 5-325 mg per tablet 1 Tablet, Oral, EVERY 6 HOURS AS NEEDED    hydrOXYzine HCL (ATARAX) 10 mg tablet TAKE 1 TABLET BY MOUTH THREE TIMES DAILY AS NEEDED FOR ANXIETY OR ITCHING    ibuprofen (MOTRIN) 600 mg, Oral, EVERY 6 HOURS AS NEEDED    medroxyPROGESTERone (DEPO-PROVERA) 150 mg, IntraMUSCular, EVERY 3 MONTHS    methocarbamoL (ROBAXIN) 500 mg, Oral, 3 TIMES DAILY    montelukast (SINGULAIR) 5 mg chewable tablet No dose, route, or frequency recorded. naproxen (NAPROSYN) 500 mg, Oral, 2 TIMES DAILY WITH MEALS    ondansetron (ZOFRAN ODT) 4 mg, Oral, EVERY 8 HOURS AS NEEDED        Assessment/Plan:     The above diagnosis is a acute on chronic problem. We discussed expected course, resolution, and complications of diagnosis in detail. I advised her to call back or return to office if symptoms worsen/change/persist.        ICD-10-CM ICD-9-CM    1. Encounter to establish care with new doctor  Z76.89 V65.8 TSH 3RD GENERATION      URINALYSIS W/ RFLX MICROSCOPIC      REFERRAL TO GYNECOLOGY      2. Anxiety  F41.9 300.00 REFERRAL TO BEHAVIORAL HEALTH      3. Depression, unspecified depression type  F32. A 311       4. Need for hepatitis C screening test  Z11.59 V73.89 HEPATITIS C AB, RFLX TO QT BY PCR      5. Cafe au lait spots  L81.3 709.09 REFERRAL TO DERMATOLOGY         Return in about 2 months (around 10/22/2022) for can be virtual.    An electronic signature was used to authenticate this note.   -- James Matthews NP

## 2022-08-22 NOTE — PROGRESS NOTES
Chief Complaint   Patient presents with    Establish Care     Previously with Roopa Conklin MD - last saw in February 2022    Anxiety     No flowsheet data found. 3 most recent PHQ Screens 8/22/2022   Little interest or pleasure in doing things Not at all   Feeling down, depressed, irritable, or hopeless Several days   Total Score PHQ 2 1       Abuse Screening Questionnaire 8/22/2022   Do you ever feel afraid of your partner? N   Are you in a relationship with someone who physically or mentally threatens you? N   Is it safe for you to go home?  Y       ADL Assessment 8/22/2022   Feeding yourself No Help Needed   Getting from bed to chair No Help Needed   Getting dressed No Help Needed   Bathing or showering No Help Needed   Walk across the room (includes cane/walker) No Help Needed   Using the telphone No Help Needed   Taking your medications No Help Needed   Preparing meals No Help Needed   Managing money (expenses/bills) No Help Needed   Moderately strenuous housework (laundry) No Help Needed   Shopping for personal items (toiletries/medicines) No Help Needed   Shopping for groceries No Help Needed   Driving No Help Needed   Climbing a flight of stairs No Help Needed   Getting to places beyond walking distances No Help Needed

## 2022-08-22 NOTE — PATIENT INSTRUCTIONS
It was a pleasure taking care of you. Please have labs drawn at [facility] and follow up with referrals made. Make appt in 2 months for follow up of depression. Sooner for any new or worsening symptoms.     Juan Antonio Vela, NP

## 2022-08-30 LAB
APPEARANCE UR: CLEAR
BACTERIA #/AREA URNS HPF: ABNORMAL /[HPF]
BILIRUB UR QL STRIP: NEGATIVE
CASTS URNS QL MICRO: ABNORMAL /LPF
COLOR UR: YELLOW
EPI CELLS #/AREA URNS HPF: ABNORMAL /HPF (ref 0–10)
GLUCOSE UR QL STRIP: NEGATIVE
HCV AB S/CO SERPL IA: <0.1 S/CO RATIO (ref 0–0.9)
HCV AB SERPL QL IA: NORMAL
HGB UR QL STRIP: NEGATIVE
KETONES UR QL STRIP: NEGATIVE
LEUKOCYTE ESTERASE UR QL STRIP: ABNORMAL
MICRO URNS: ABNORMAL
NITRITE UR QL STRIP: NEGATIVE
PH UR STRIP: 6.5 [PH] (ref 5–7.5)
PROT UR QL STRIP: ABNORMAL
RBC #/AREA URNS HPF: ABNORMAL /HPF (ref 0–2)
SP GR UR STRIP: 1.02 (ref 1–1.03)
TSH SERPL DL<=0.005 MIU/L-ACNC: 1.64 UIU/ML (ref 0.45–4.5)
UROBILINOGEN UR STRIP-MCNC: 1 MG/DL (ref 0.2–1)
WBC #/AREA URNS HPF: ABNORMAL /HPF (ref 0–5)

## 2022-09-01 RX ORDER — NITROFURANTOIN 25; 75 MG/1; MG/1
100 CAPSULE ORAL 2 TIMES DAILY
Qty: 14 CAPSULE | Refills: 0 | Status: SHIPPED | OUTPATIENT
Start: 2022-09-01 | End: 2022-09-08

## 2022-10-16 ENCOUNTER — APPOINTMENT (OUTPATIENT)
Dept: GENERAL RADIOLOGY | Age: 22
End: 2022-10-16
Attending: EMERGENCY MEDICINE
Payer: COMMERCIAL

## 2022-10-16 ENCOUNTER — HOSPITAL ENCOUNTER (EMERGENCY)
Age: 22
Discharge: HOME | End: 2022-10-16
Attending: EMERGENCY MEDICINE
Payer: COMMERCIAL

## 2022-10-16 VITALS
OXYGEN SATURATION: 100 % | RESPIRATION RATE: 18 BRPM | WEIGHT: 138.23 LBS | DIASTOLIC BLOOD PRESSURE: 80 MMHG | TEMPERATURE: 98.7 F | HEIGHT: 67 IN | SYSTOLIC BLOOD PRESSURE: 134 MMHG | HEART RATE: 99 BPM | BODY MASS INDEX: 21.7 KG/M2

## 2022-10-16 DIAGNOSIS — R10.13 ABDOMINAL PAIN, EPIGASTRIC: Primary | ICD-10-CM

## 2022-10-16 DIAGNOSIS — K59.00 CONSTIPATION, UNSPECIFIED CONSTIPATION TYPE: ICD-10-CM

## 2022-10-16 LAB
ANION GAP SERPL CALC-SCNC: 7 MMOL/L (ref 5–15)
APPEARANCE UR: CLEAR
BACTERIA URNS QL MICRO: NEGATIVE /HPF
BASOPHILS # BLD: 0 K/UL (ref 0–0.1)
BASOPHILS NFR BLD: 1 % (ref 0–1)
BILIRUB UR QL: NEGATIVE
BUN SERPL-MCNC: 9 MG/DL (ref 6–20)
BUN/CREAT SERPL: 10 (ref 12–20)
CALCIUM SERPL-MCNC: 9.2 MG/DL (ref 8.5–10.1)
CHLORIDE SERPL-SCNC: 108 MMOL/L (ref 97–108)
CO2 SERPL-SCNC: 26 MMOL/L (ref 21–32)
COLOR UR: ABNORMAL
CREAT SERPL-MCNC: 0.92 MG/DL (ref 0.55–1.02)
DIFFERENTIAL METHOD BLD: NORMAL
EOSINOPHIL # BLD: 0.1 K/UL (ref 0–0.4)
EOSINOPHIL NFR BLD: 1 % (ref 0–7)
EPITH CASTS URNS QL MICRO: ABNORMAL /LPF
ERYTHROCYTE [DISTWIDTH] IN BLOOD BY AUTOMATED COUNT: 12.4 % (ref 11.5–14.5)
GLUCOSE SERPL-MCNC: 91 MG/DL (ref 65–100)
GLUCOSE UR STRIP.AUTO-MCNC: NEGATIVE MG/DL
HCG UR QL: NEGATIVE
HCT VFR BLD AUTO: 37.2 % (ref 35–47)
HGB BLD-MCNC: 13.1 G/DL (ref 11.5–16)
HGB UR QL STRIP: NEGATIVE
HYALINE CASTS URNS QL MICRO: ABNORMAL /LPF (ref 0–2)
IMM GRANULOCYTES # BLD AUTO: 0 K/UL (ref 0–0.04)
IMM GRANULOCYTES NFR BLD AUTO: 0 % (ref 0–0.5)
KETONES UR QL STRIP.AUTO: NEGATIVE MG/DL
LEUKOCYTE ESTERASE UR QL STRIP.AUTO: ABNORMAL
LYMPHOCYTES # BLD: 2.9 K/UL (ref 0.8–3.5)
LYMPHOCYTES NFR BLD: 38 % (ref 12–49)
MCH RBC QN AUTO: 29.1 PG (ref 26–34)
MCHC RBC AUTO-ENTMCNC: 35.2 G/DL (ref 30–36.5)
MCV RBC AUTO: 82.7 FL (ref 80–99)
MONOCYTES # BLD: 0.6 K/UL (ref 0–1)
MONOCYTES NFR BLD: 8 % (ref 5–13)
NEUTS SEG # BLD: 4 K/UL (ref 1.8–8)
NEUTS SEG NFR BLD: 52 % (ref 32–75)
NITRITE UR QL STRIP.AUTO: NEGATIVE
NRBC # BLD: 0 K/UL (ref 0–0.01)
NRBC BLD-RTO: 0 PER 100 WBC
PH UR STRIP: 7 [PH] (ref 5–8)
PLATELET # BLD AUTO: 244 K/UL (ref 150–400)
PMV BLD AUTO: 10.2 FL (ref 8.9–12.9)
POTASSIUM SERPL-SCNC: 3.5 MMOL/L (ref 3.5–5.1)
PROT UR STRIP-MCNC: NEGATIVE MG/DL
RBC # BLD AUTO: 4.5 M/UL (ref 3.8–5.2)
RBC #/AREA URNS HPF: ABNORMAL /HPF (ref 0–5)
SODIUM SERPL-SCNC: 141 MMOL/L (ref 136–145)
SP GR UR REFRACTOMETRY: 1.02
UA: UC IF INDICATED,UAUC: ABNORMAL
UROBILINOGEN UR QL STRIP.AUTO: 1 EU/DL (ref 0.2–1)
WBC # BLD AUTO: 7.7 K/UL (ref 3.6–11)
WBC URNS QL MICRO: ABNORMAL /HPF (ref 0–4)

## 2022-10-16 PROCEDURE — 87086 URINE CULTURE/COLONY COUNT: CPT

## 2022-10-16 PROCEDURE — 74018 RADEX ABDOMEN 1 VIEW: CPT

## 2022-10-16 PROCEDURE — 36415 COLL VENOUS BLD VENIPUNCTURE: CPT

## 2022-10-16 PROCEDURE — 81001 URINALYSIS AUTO W/SCOPE: CPT

## 2022-10-16 PROCEDURE — 74011250636 HC RX REV CODE- 250/636: Performed by: EMERGENCY MEDICINE

## 2022-10-16 PROCEDURE — 85025 COMPLETE CBC W/AUTO DIFF WBC: CPT

## 2022-10-16 PROCEDURE — 80048 BASIC METABOLIC PNL TOTAL CA: CPT

## 2022-10-16 PROCEDURE — 96374 THER/PROPH/DIAG INJ IV PUSH: CPT

## 2022-10-16 PROCEDURE — 99284 EMERGENCY DEPT VISIT MOD MDM: CPT

## 2022-10-16 PROCEDURE — 81025 URINE PREGNANCY TEST: CPT

## 2022-10-16 RX ORDER — KETOROLAC TROMETHAMINE 30 MG/ML
30 INJECTION, SOLUTION INTRAMUSCULAR; INTRAVENOUS
Status: COMPLETED | OUTPATIENT
Start: 2022-10-16 | End: 2022-10-16

## 2022-10-16 RX ADMIN — KETOROLAC TROMETHAMINE 30 MG: 30 INJECTION, SOLUTION INTRAMUSCULAR at 03:17

## 2022-10-16 RX ADMIN — SODIUM CHLORIDE 1000 ML: 9 INJECTION, SOLUTION INTRAVENOUS at 03:17

## 2022-10-16 NOTE — ED PROVIDER NOTES
EMERGENCY DEPARTMENT HISTORY AND PHYSICAL EXAM      Date: 10/16/2022  Patient Name: Marylu Lowry    History of Presenting Illness     Chief Complaint   Patient presents with    Abdominal Pain     States their abd has been distended for a few weeks but they have been short of breath today. States they get bloated a lot so thought nothing of it. No issues with bowel movements. Ambulatory        History Provided By: Patient    HPI: Marylu Lowry, 23 y.o. female presents to the ED with cc of abdominal pain. Patient states that she has had abdominal distention pain and discomfort for the past week. Patient states severe abdominal pain primarily in the epigastric region radiating up into her chest.  Patient states some numbness and tingling to her face. She states her last bowel movement was several days ago. She denies any nausea or vomiting. Pain in her abdomen is sharp stabbing and severe with no alleviating or exacerbating factors. She denies any vaginal discharge or bleeding. She denies any urinary symptoms. As well as    There are no other complaints, changes, or physical findings at this time. PCP: Caitie Muñoz NP    No current facility-administered medications on file prior to encounter. Current Outpatient Medications on File Prior to Encounter   Medication Sig Dispense Refill    hydrOXYzine HCL (ATARAX) 10 mg tablet TAKE 1 TABLET BY MOUTH THREE TIMES DAILY AS NEEDED FOR ANXIETY OR ITCHING      medroxyPROGESTERone (DEPO-PROVERA) 150 mg/mL syrg 150 mg by IntraMUSCular route every three (3) months. DULoxetine (CYMBALTA) 20 mg capsule Take 1 Capsule by mouth daily for 60 days. 30 Capsule 1       Past History     Past Medical History:  No past medical history on file. Past Surgical History:  Past Surgical History:   Procedure Laterality Date    HX ORTHOPAEDIC      right ACL    HX TYMPANOSTOMY         Family History:  No family history on file.     Social History:  Social History Tobacco Use    Smoking status: Never    Smokeless tobacco: Never   Vaping Use    Vaping Use: Never used   Substance Use Topics    Alcohol use: Never    Drug use: Never       Allergies: Allergies   Allergen Reactions    Pcn [Penicillins] Rash         Review of Systems   Review of Systems   Constitutional: Negative. Negative for appetite change, chills, fatigue and fever. HENT: Negative. Negative for congestion, rhinorrhea, sinus pressure and sore throat. Eyes: Negative. Respiratory: Negative. Negative for cough, choking, chest tightness, shortness of breath and wheezing. Cardiovascular: Negative. Negative for chest pain, palpitations and leg swelling. Gastrointestinal:  Positive for abdominal pain and constipation. Negative for diarrhea, nausea and vomiting. Endocrine: Negative. Genitourinary: Negative. Negative for difficulty urinating, dysuria, flank pain, urgency, vaginal bleeding and vaginal discharge. Musculoskeletal: Negative. Skin: Negative. Neurological: Negative. Negative for dizziness, speech difficulty, weakness, light-headedness, numbness and headaches. Psychiatric/Behavioral: Negative. All other systems reviewed and are negative. Physical Exam   Physical Exam  Vitals and nursing note reviewed. Constitutional:       General: She is not in acute distress. Appearance: She is well-developed. She is not diaphoretic. HENT:      Head: Normocephalic and atraumatic. Mouth/Throat:      Mouth: Mucous membranes are moist.      Pharynx: No oropharyngeal exudate. Eyes:      Conjunctiva/sclera: Conjunctivae normal.      Pupils: Pupils are equal, round, and reactive to light. Neck:      Vascular: No JVD. Trachea: No tracheal deviation. Cardiovascular:      Rate and Rhythm: Normal rate and regular rhythm. Heart sounds: Normal heart sounds. No murmur heard. Pulmonary:      Effort: Pulmonary effort is normal. No respiratory distress.       Breath sounds: Normal breath sounds. No stridor. No wheezing or rales. Comments: Patient hyperventilating  Abdominal:      General: There is no distension. Palpations: Abdomen is soft. Tenderness: There is generalized abdominal tenderness. There is no guarding or rebound. Musculoskeletal:         General: No tenderness. Normal range of motion. Cervical back: Normal range of motion and neck supple. Skin:     General: Skin is warm and dry. Capillary Refill: Capillary refill takes less than 2 seconds. Neurological:      Mental Status: She is alert and oriented to person, place, and time. Cranial Nerves: No cranial nerve deficit. Comments: No gross motor or sensory deficits    Psychiatric:         Behavior: Behavior normal.       Diagnostic Study Results     Labs -     Recent Results (from the past 12 hour(s))   CBC WITH AUTOMATED DIFF    Collection Time: 10/16/22  2:11 AM   Result Value Ref Range    WBC 7.7 3.6 - 11.0 K/uL    RBC 4.50 3.80 - 5.20 M/uL    HGB 13.1 11.5 - 16.0 g/dL    HCT 37.2 35.0 - 47.0 %    MCV 82.7 80.0 - 99.0 FL    MCH 29.1 26.0 - 34.0 PG    MCHC 35.2 30.0 - 36.5 g/dL    RDW 12.4 11.5 - 14.5 %    PLATELET 660 973 - 599 K/uL    MPV 10.2 8.9 - 12.9 FL    NRBC 0.0 0  WBC    ABSOLUTE NRBC 0.00 0.00 - 0.01 K/uL    NEUTROPHILS 52 32 - 75 %    LYMPHOCYTES 38 12 - 49 %    MONOCYTES 8 5 - 13 %    EOSINOPHILS 1 0 - 7 %    BASOPHILS 1 0 - 1 %    IMMATURE GRANULOCYTES 0 0.0 - 0.5 %    ABS. NEUTROPHILS 4.0 1.8 - 8.0 K/UL    ABS. LYMPHOCYTES 2.9 0.8 - 3.5 K/UL    ABS. MONOCYTES 0.6 0.0 - 1.0 K/UL    ABS. EOSINOPHILS 0.1 0.0 - 0.4 K/UL    ABS. BASOPHILS 0.0 0.0 - 0.1 K/UL    ABS. IMM.  GRANS. 0.0 0.00 - 0.04 K/UL    DF AUTOMATED     METABOLIC PANEL, BASIC    Collection Time: 10/16/22  2:11 AM   Result Value Ref Range    Sodium 141 136 - 145 mmol/L    Potassium 3.5 3.5 - 5.1 mmol/L    Chloride 108 97 - 108 mmol/L    CO2 26 21 - 32 mmol/L    Anion gap 7 5 - 15 mmol/L Glucose 91 65 - 100 mg/dL    BUN 9 6 - 20 MG/DL    Creatinine 0.92 0.55 - 1.02 MG/DL    BUN/Creatinine ratio 10 (L) 12 - 20      eGFR >60 >60 ml/min/1.73m2    Calcium 9.2 8.5 - 10.1 MG/DL   URINALYSIS W/ REFLEX CULTURE    Collection Time: 10/16/22  3:09 AM    Specimen: Urine   Result Value Ref Range    Color YELLOW/STRAW      Appearance CLEAR CLEAR      Specific gravity 1.018      pH (UA) 7.0 5.0 - 8.0      Protein Negative NEG mg/dL    Glucose Negative NEG mg/dL    Ketone Negative NEG mg/dL    Bilirubin Negative NEG      Blood Negative NEG      Urobilinogen 1.0 0.2 - 1.0 EU/dL    Nitrites Negative NEG      Leukocyte Esterase SMALL (A) NEG      UA:UC IF INDICATED URINE CULTURE ORDERED (A) CNI      WBC 10-20 0 - 4 /hpf    RBC 0-5 0 - 5 /hpf    Epithelial cells MODERATE (A) FEW /lpf    Bacteria Negative NEG /hpf    Hyaline cast 0-2 0 - 2 /lpf   HCG URINE, QL. - POC    Collection Time: 10/16/22  3:13 AM   Result Value Ref Range    Pregnancy test,urine (POC) Negative NEG         Radiologic Studies -   XR ABD (KUB)   Final Result   Moderate fecal stasis. Normal bowel gas pattern. CT Results  (Last 48 hours)      None          CXR Results  (Last 48 hours)      None            Medical Decision Making   I am the first provider for this patient. I reviewed the vital signs, available nursing notes, past medical history, past surgical history, family history and social history. Vital Signs-Reviewed the patient's vital signs. Patient Vitals for the past 12 hrs:   Temp Pulse Resp BP SpO2   10/16/22 0400 -- -- -- 134/80 100 %   10/16/22 0315 -- -- -- 120/74 100 %   10/16/22 0207 98.7 °F (37.1 °C) 99 18 139/84 100 %         Records Reviewed: Nursing Notes    Provider Notes (Medical Decision Making):   DDx-     ED Course:   Initial assessment performed. The patients presenting problems have been discussed, and they are in agreement with the care plan formulated and outlined with them.   I have encouraged them to ask questions as they arise throughout their visit. Patient given a dose of IV Toradol. Patient had resolution of pain. Discussed results with the patient including constipation is the likely cause of her abdominal bloating pain and discomfort. Discussed over-the-counter MiraLAX starting with 4 scoops in 32 ounces this morning with increased water intake. Discussed Tylenol or ibuprofen for pain and discomfort. Disposition:    DC- Adult Discharges: All of the diagnostic tests were reviewed and questions answered. Diagnosis, care plan and treatment options were discussed. The patient understands the instructions and will follow up as directed. The patients results have been reviewed with them. They have been counseled regarding their diagnosis. The patient verbally convey understanding and agreement of the signs, symptoms, diagnosis, treatment and prognosis and additionally agrees to follow up as recommended with their PCP in 24 - 48 hours. They also agree with the care-plan and convey that all of their questions have been answered. I have also put together some discharge instructions for them that include: 1) educational information regarding their diagnosis, 2) how to care for their diagnosis at home, as well a 3) list of reasons why they would want to return to the ED prior to their follow-up appointment, should their condition change. DISCHARGE PLAN:  1. Discharge Medication List as of 10/16/2022  4:05 AM        2. Follow-up Information       Follow up With Specialties Details Why Contact Mary Lopez NP Family Medicine  As needed [address]  [phone]            3. Return to ED if worse     Diagnosis     Clinical Impression:   1. Abdominal pain, epigastric    2.  Constipation, unspecified constipation type        Attestations:    Kamryn Gan, DO        Please note that this dictation was completed with [employer], the [employer] voice recognition software. Quite often unanticipated grammatical, syntax, homophones, and other interpretive errors are inadvertently transcribed by the computer software. Please disregard these errors. Please excuse any errors that have escaped final proofreading. Thank you.

## 2022-10-16 NOTE — DISCHARGE INSTRUCTIONS
Tomorrow would recommend 3 scoops of Miralax in 32 ounces anddrink over one hr if not having a BM by mid-afternoon. Would recommend oneadditional scoop. Make sure you are drinking plent oy water ~64ounce a day to stay wekk hydrated as dehydration can result in abdominal crampin and/or constipation issues.

## 2022-10-16 NOTE — ED NOTES
I have reviewed verbal and written discharge instructions with the patient. The patient verbalized understanding. IV removed, Pt alert and ambulatory upon discharge.

## 2022-10-17 ENCOUNTER — PATIENT OUTREACH (OUTPATIENT)
Dept: OTHER | Age: 22
End: 2022-10-17

## 2022-10-17 LAB
BACTERIA SPEC CULT: NORMAL
CC UR VC: NORMAL
SERVICE CMNT-IMP: NORMAL

## 2022-10-17 NOTE — PROGRESS NOTES
HPRR progress note    Patient eligible for [facility] care management  Discussed the care management program.  Patient agrees to care management services at this time. Pt was seen at [facility] ER 10/16/22    Diagnosis Comment   Abdominal pain, epigastric    Constipation, unspecified constipation type      Pt reported RUQ pain 5/10 on pain scale without pain med. Pt reported having a formed BM today, denies blood. Pt reported abdomen remains distended and has now started having hip pain after abdominal pain began approx 3 weeks ago. Pt does report frequent urination, denies temp. Encouraged to follow-up with PCP ASAP. Patient denies C/P, SOB, cough, wheezing, fever, pain/swelling of legs or feet, N/V, diarrhea, difficulty urinating or constipation. Appetite/hydration good. PMH: No past medical history on file.     Social History:   Social History     Socioeconomic History    Marital status: SINGLE     Spouse name: Not on file    Number of children: Not on file    Years of education: Not on file    Highest education level: Not on file   Occupational History    Not on file   Tobacco Use    Smoking status: Never    Smokeless tobacco: Never   Vaping Use    Vaping Use: Never used   Substance and Sexual Activity    Alcohol use: Never    Drug use: Never    Sexual activity: Yes     Partners: Male     Birth control/protection: Injection, None   Other Topics Concern    Not on file   Social History Narrative    Not on file     Social Determinants of Health     Financial Resource Strain: Not on file   Food Insecurity: Not on file   Transportation Needs: Not on file   Physical Activity: Inactive    Days of Exercise per Week: 0 days    Minutes of Exercise per Session: 0 min   Stress: Not on file   Social Connections: Not on file   Intimate Partner Violence: Not At Risk    Fear of Current or Ex-Partner: No    Emotionally Abused: No    Physically Abused: No    Sexually Abused: No   Housing Stability: Not on file Patient's primary care provider relationship reviewed with patient and modified, as applicable. Care management assessment completed:    Condition Focused Assessment: Gastrointestinal Condition Focused Assessment    Skin- any open wounds, incisions or appliance no  Description of wound- n/a  New or worsening pain? yes  If yes, pain rated 0-10: 5 Location/pain characteristics: RUQ   New or worsening numbness or tingling? no  If yes, location of numbness and tingling: n/a  Activity level- moving several times a day, or as recommended? yes  Abnormal activity level reported: no   Nutrition- prescribed diet? no   Diet prescribed or recommended: reg  Difficulty swallowing no  Last weight of 138 lbs on 10/16/22  Last BM on 10/17/22 abnormal consistency or amount no  Abnormal labs no     In the last 24 hour have you experienced; Fever no  Low body temperature no  Chills or shaking no  Sweating no  Fast heart rate no  Fast breathing no  Dizziness/lightheadedness no  Confusion or unusual change in mental status no  Diarrhea no  Nausea no   Vomiting no  Shortness of breath or difficulty breathing no  Less urine output no  Cold, clammy, and pale skin no  Skin rash or skin color changes no       Medications:  New Medications at Discharge: no  Changed Medications at Discharge: no  Discontinued Medications at Discharge: no    Current Outpatient Medications   Medication Sig    medroxyPROGESTERone (DEPO-PROVERA) 150 mg/mL syrg 150 mg by IntraMUSCular route every three (3) months. DULoxetine (CYMBALTA) 20 mg capsule Take 1 Capsule by mouth daily for 60 days. No current facility-administered medications for this visit. Medications Discontinued During This Encounter   Medication Reason    hydrOXYzine HCL (ATARAX) 10 mg tablet Therapy Completed       Performed medication reconciliation with patient, and patient verbalizes understanding of administration of home medications.   There were no barriers to obtaining medications identified at this time. Preventive Care     Health Maintenance   Topic Date Due    Flu Vaccine (1) 08/01/2022    DTaP/Tdap/Td series (7 - Td or Tdap) 08/06/2023    Depression Monitoring  08/22/2023    Hepatitis C Screening  Completed    HPV Age 9Y-34Y  Completed    COVID-19 Vaccine  Completed    Hepatitis A Peds Age 1-18  Aged Out    Pneumococcal 0-64 years  Aged Out       Healthy Habits    Nutrition: well balanced meal    Movement: every 1-2 hours      Goals         Care Coordination related to abdominal pain (pt-stated)       Establish PCP relationships and regularly scheduled appointments. PCP - 10/24/22  GI - 10/19/22        Knowledge and adherence to medication plan. Taking prescribed meds        Supportive resources in place to maintain patient in the community (ie., home health, equipment, DME, refer to, etc.)       [facility] - # [phone] 24/7  Nurse Access line 24/7  If you have COVID-19 symptoms, have tested positive for COVID-19 or have been exposed to someone with COVID-19, call the Nurse Access Line at [phone] and select option 8. Be Well  [address] [phone]  HR Service Now - [facility] Workday  IT - [phone]  [employer] Help - 1- [phone]  Leave of absence from work (other than jury duty and bereavement), call [phone] option 1 or call the dedicated line at [address] [place] [address] ([phone]). [employer] agents are available weekdays 8:30 a.m. - 10:30 p.m. ET. You also may: [place] for website access at [facility]. Download the D[employer]App mobile cahtryn on or after Jan. 1 for on-the-go access to your team reports. Submit information by fax to [phone]. Life Matters - [phone] Go to [employer] on the Internet or your mobile device and enter the password [email or web address] to access resources, educational information, and self-service options. Barriers/Support system:  patient and parents      Barriers/Challenges to Care:  []  Decline in memory    []  Language barrier     []  Emotional                  []  Limited mobility  []  Lack of motivation     [] Vision, hearing or cognitive impairment []  Knowledge [] Financial Barriers []  Lack of support  [x]  Pain []  Other     PCP/Specialist follow up:   Future Appointments   Date Time Provider Krunal Dee   10/24/2022  9:00 AM Abhishek Ansari NP [facility] AMB      Reviewed red flags with patient, and patient verbalizes understanding. Patient given an opportunity to ask questions. No other clinical/social/functional needs noted. The patient agrees to contact the PCP office for questions related to their healthcare. The patient expressed thanks, offered no additional questions and ended the call.       Plan for next call: 1 week

## 2022-10-20 ENCOUNTER — TRANSCRIBE ORDER (OUTPATIENT)
Dept: SCHEDULING | Age: 22
End: 2022-10-20

## 2022-10-20 DIAGNOSIS — K59.04 CHRONIC IDIOPATHIC CONSTIPATION: ICD-10-CM

## 2022-10-20 DIAGNOSIS — R10.13 EPIGASTRIC PAIN: Primary | ICD-10-CM

## 2022-10-24 ENCOUNTER — PATIENT OUTREACH (OUTPATIENT)
Dept: OTHER | Age: 22
End: 2022-10-24

## 2022-10-24 ENCOUNTER — VIRTUAL VISIT (OUTPATIENT)
Dept: INTERNAL MEDICINE CLINIC | Age: 22
End: 2022-10-24
Payer: COMMERCIAL

## 2022-10-24 DIAGNOSIS — R10.9 ABDOMINAL PAIN, UNSPECIFIED ABDOMINAL LOCATION: Primary | ICD-10-CM

## 2022-10-24 DIAGNOSIS — G47.9 SLEEP DIFFICULTIES: ICD-10-CM

## 2022-10-24 PROCEDURE — 99214 OFFICE O/P EST MOD 30 MIN: CPT | Performed by: NURSE PRACTITIONER

## 2022-10-24 RX ORDER — DICYCLOMINE HYDROCHLORIDE 10 MG/1
10 CAPSULE ORAL 4 TIMES DAILY
Qty: 80 CAPSULE | Refills: 0 | Status: SHIPPED | OUTPATIENT
Start: 2022-10-24 | End: 2022-11-02

## 2022-10-24 NOTE — PROGRESS NOTES
HPRP Outreach    Call placed to patient, no answer. Purpose of TC    Pt was seen at \[facility]\"" ER 10/16/22     Diagnosis Comment   Abdominal pain, epigastric     Constipation, unspecified constipation type         TC details    Call placed to patient, no answer. Voicemail left to return call. Plan of action  Provide support to patient. CM will follow-up in 1 week     Goals         Care Coordination related to abdominal pain (pt-stated)       Establish PCP relationships and regularly scheduled appointments. PCP - 10/24/22  GI - 10/19/22    10/24/22 Call placed to patient, no answer. Knowledge and adherence to medication plan. Taking prescribed meds        Supportive resources in place to maintain patient in the community (ie., home health, equipment, DME, refer to, etc.)       [facility] - # [phone] 24/7  Nurse Access line 24/7  If you have COVID-19 symptoms, have tested positive for COVID-19 or have been exposed to someone with COVID-19, call the Nurse Access Line at [phone] and select option 8. Be Well  [address] [phone]  HR Service Now - [facility] Workday  IT - [phone]  MyChart Help - - [phone]  Leave of absence from work (other than jury duty and bereavement), call [phone] option 1 or call the dedicated line at [facility] ([phone]). [employer] agents are available weekdays 8:30 a.m. - 10:30 p.m. ET. You also may: [place] for website access at [facility]. Download the [employer] mobile cathryn on or after Jan. 1 for on-the-go access to your team reports. Submit information by fax to [phone]. Life Matters - [phone] Go to [employer] on the Internet or your mobile device and enter the password [email or web address] to access resources, educational information, and self-service options.

## 2022-10-24 NOTE — PROGRESS NOTES
Serene Booker (: 2000) is a 23 y.o. female is here for evaluation of the following chief complaint(s): Labs (Review results of labs)        Subjective/Objective:   Wili Gilbert was seen today for Labs (Review results of labs)  Pt also complaint of continued abd pain and feeling bloated. Pt has constipation now. Pt was seen by her GI physician per patient on 10/19/2022 who ordered a HIDA scan scheduled 10/28/2022. Pt denies f/n/v/d normal brown stools with some constipation. Pt prescribed Bentyl. Pt also states has difficulty sleeping. Pt sleeps about 5 hours a night. Midol has not helped neither has Dayquil. Pt counseled that this will not help for sleep. Discussed with patient trying benadryl for sleep. Reviewed labs as well and no new labs or interventions needed as a result. Review of Systems   Constitutional: Negative. Difficulty sleeping. HENT: Negative. Eyes: Negative. Respiratory: Negative. Cardiovascular: Negative. Gastrointestinal:  Positive for abdominal pain and constipation. Negative for nausea and vomiting. Genitourinary: Negative. Musculoskeletal: Negative. Skin: Negative. Neurological: Negative. Endo/Heme/Allergies: Negative. Psychiatric/Behavioral: Negative. Return in about 1 month (around 2022) for recheck of abd pain and sleep difficulties.  Gregoria Mack:  \"[x]\" Indicates a positive item  \"[]\" Indicates a negative item  -- DELETE ALL ITEMS NOT EXAMINED]    Constitutional: [x] Appears well-developed and well-nourished [x] No apparent distress      [] Abnormal -     Mental status: [x] Alert and awake  [x] Oriented to person/place/time [x] Able to follow commands    [] Abnormal -     Eyes:   EOM    [x]  Normal    [] Abnormal -   Sclera  [x]  Normal    [] Abnormal -          Discharge [x]  None visible   [] Abnormal -     HENT: [x] Normocephalic, atraumatic  [] Abnormal -   [x] Mouth/Throat: Mucous membranes are moist    External Ears [x] Normal  [] Abnormal -    Neck: [x] No visualized mass [] Abnormal -     Pulmonary/Chest: [x] Respiratory effort normal   [x] No visualized signs of difficulty breathing or respiratory distress        [] Abnormal -      Musculoskeletal:   [x] Normal gait with no signs of ataxia         [x] Normal range of motion of neck        [] Abnormal -     Neurological:        [x] No Facial Asymmetry (Cranial nerve 7 motor function) (limited exam due to video visit)          [x] No gaze palsy        [] Abnormal -          Skin:        [x] No significant exanthematous lesions or discoloration noted on facial skin         [] Abnormal -            Psychiatric:       [x] Normal Affect [] Abnormal -        [x] No Hallucinations    Other pertinent observable physical exam findings:-    On this date 10/24/2022 I have spent 30 minutes reviewing previous notes, test results and face to face (virtual) with the patient discussing the diagnosis and importance of compliance with the treatment plan as well as documenting on the day of the visit. Sandor Quinn, was evaluated through a synchronous (real-time) audio-video encounter. The patient (or guardian if applicable) is aware that this is a billable service, which includes applicable co-pays. This Virtual Visit was conducted with patient's (and/or legal guardian's) consent. The visit was conducted pursuant to the emergency declaration under the [address] [place] [address] authority and the [employer] General Act. Patient identification was verified, and a caregiver was present when appropriate. The patient was located at: Home: [address] [place] [address]  The provider was located at: [facility] [place] [address]       An electronic signature was used to authenticate this note.   -- Caro Harada, NP        There were no vitals taken for this visit. No past medical history on file.           Assessment/Plan:

## 2022-10-28 ENCOUNTER — HOSPITAL ENCOUNTER (OUTPATIENT)
Dept: NUCLEAR MEDICINE | Age: 22
Discharge: HOME | End: 2022-10-28
Attending: NURSE PRACTITIONER
Payer: COMMERCIAL

## 2022-10-28 VITALS — BODY MASS INDEX: 21.46 KG/M2 | WEIGHT: 137 LBS

## 2022-10-28 DIAGNOSIS — K59.04 CHRONIC IDIOPATHIC CONSTIPATION: ICD-10-CM

## 2022-10-28 DIAGNOSIS — R10.13 EPIGASTRIC PAIN: ICD-10-CM

## 2022-10-28 PROCEDURE — 74011250636 HC RX REV CODE- 250/636: Performed by: NURSE PRACTITIONER

## 2022-10-28 PROCEDURE — 78227 HEPATOBIL SYST IMAGE W/DRUG: CPT

## 2022-10-28 RX ORDER — KIT FOR THE PREPARATION OF TECHNETIUM TC 99M MEBROFENIN 45 MG/10ML
5.1 INJECTION, POWDER, LYOPHILIZED, FOR SOLUTION INTRAVENOUS
Status: COMPLETED | OUTPATIENT
Start: 2022-10-28 | End: 2022-10-28

## 2022-10-28 RX ADMIN — SINCALIDE 1.24 MCG: 5 INJECTION, POWDER, LYOPHILIZED, FOR SOLUTION INTRAVENOUS at 12:51

## 2022-10-28 RX ADMIN — KIT FOR THE PREPARATION OF TECHNETIUM TC 99M MEBROFENIN 5.1 MILLICURIE: 45 INJECTION, POWDER, LYOPHILIZED, FOR SOLUTION INTRAVENOUS at 12:05

## 2022-10-29 ENCOUNTER — HOSPITAL ENCOUNTER (EMERGENCY)
Age: 22
Discharge: HOME | End: 2022-10-29
Attending: EMERGENCY MEDICINE
Payer: COMMERCIAL

## 2022-10-29 VITALS
OXYGEN SATURATION: 99 % | RESPIRATION RATE: 18 BRPM | HEIGHT: 67 IN | BODY MASS INDEX: 21.5 KG/M2 | SYSTOLIC BLOOD PRESSURE: 128 MMHG | TEMPERATURE: 97.3 F | HEART RATE: 98 BPM | WEIGHT: 137 LBS | DIASTOLIC BLOOD PRESSURE: 72 MMHG

## 2022-10-29 DIAGNOSIS — R10.11 RUQ PAIN: Primary | ICD-10-CM

## 2022-10-29 DIAGNOSIS — E16.2 HYPOGLYCEMIA: ICD-10-CM

## 2022-10-29 LAB
ALBUMIN SERPL-MCNC: 4.5 G/DL (ref 3.4–5)
ALBUMIN/GLOB SERPL: 1.1 {RATIO} (ref 0.8–1.7)
ALP SERPL-CCNC: 60 U/L (ref 45–117)
ALT SERPL-CCNC: 19 U/L (ref 13–56)
ANION GAP SERPL CALC-SCNC: 6 MMOL/L (ref 3–18)
APPEARANCE UR: CLEAR
AST SERPL-CCNC: 15 U/L (ref 10–38)
BACTERIA URNS QL MICRO: ABNORMAL /HPF
BASOPHILS # BLD: 0 K/UL (ref 0–0.1)
BASOPHILS NFR BLD: 1 % (ref 0–2)
BILIRUB DIRECT SERPL-MCNC: 0.2 MG/DL (ref 0–0.2)
BILIRUB SERPL-MCNC: 0.8 MG/DL (ref 0.2–1)
BILIRUB UR QL: NEGATIVE
BUN SERPL-MCNC: 14 MG/DL (ref 7–18)
BUN/CREAT SERPL: 17 (ref 12–20)
CALCIUM SERPL-MCNC: 9.1 MG/DL (ref 8.5–10.1)
CHLORIDE SERPL-SCNC: 105 MMOL/L (ref 100–111)
CO2 SERPL-SCNC: 28 MMOL/L (ref 21–32)
COLOR UR: YELLOW
CREAT SERPL-MCNC: 0.82 MG/DL (ref 0.6–1.3)
DIFFERENTIAL METHOD BLD: NORMAL
EOSINOPHIL # BLD: 0 K/UL (ref 0–0.4)
EOSINOPHIL NFR BLD: 1 % (ref 0–5)
EPITH CASTS URNS QL MICRO: ABNORMAL /LPF (ref 0–5)
ERYTHROCYTE [DISTWIDTH] IN BLOOD BY AUTOMATED COUNT: 12.4 % (ref 11.6–14.5)
GLOBULIN SER CALC-MCNC: 4 G/DL (ref 2–4)
GLUCOSE SERPL-MCNC: 69 MG/DL (ref 74–99)
GLUCOSE UR STRIP.AUTO-MCNC: NEGATIVE MG/DL
HCG UR QL: NEGATIVE
HCT VFR BLD AUTO: 39.1 % (ref 35–45)
HGB BLD-MCNC: 13.5 G/DL (ref 12–16)
HGB UR QL STRIP: NEGATIVE
IMM GRANULOCYTES # BLD AUTO: 0 K/UL (ref 0–0.04)
IMM GRANULOCYTES NFR BLD AUTO: 0 % (ref 0–0.5)
KETONES UR QL STRIP.AUTO: ABNORMAL MG/DL
LEUKOCYTE ESTERASE UR QL STRIP.AUTO: ABNORMAL
LIPASE SERPL-CCNC: 108 U/L (ref 73–393)
LYMPHOCYTES # BLD: 2 K/UL (ref 0.9–3.6)
LYMPHOCYTES NFR BLD: 32 % (ref 21–52)
MCH RBC QN AUTO: 29 PG (ref 24–34)
MCHC RBC AUTO-ENTMCNC: 34.5 G/DL (ref 31–37)
MCV RBC AUTO: 83.9 FL (ref 78–100)
MONOCYTES # BLD: 0.5 K/UL (ref 0.05–1.2)
MONOCYTES NFR BLD: 8 % (ref 3–10)
MUCOUS THREADS URNS QL MICRO: ABNORMAL /LPF
NEUTS SEG # BLD: 3.7 K/UL (ref 1.8–8)
NEUTS SEG NFR BLD: 59 % (ref 40–73)
NITRITE UR QL STRIP.AUTO: NEGATIVE
NRBC # BLD: 0 K/UL (ref 0–0.01)
NRBC BLD-RTO: 0 PER 100 WBC
PH UR STRIP: 6.5 [PH] (ref 5–8)
PLATELET # BLD AUTO: 272 K/UL (ref 135–420)
PMV BLD AUTO: 10.2 FL (ref 9.2–11.8)
POTASSIUM SERPL-SCNC: 3.7 MMOL/L (ref 3.5–5.5)
PROT SERPL-MCNC: 8.5 G/DL (ref 6.4–8.2)
PROT UR STRIP-MCNC: NEGATIVE MG/DL
RBC # BLD AUTO: 4.66 M/UL (ref 4.2–5.3)
RBC #/AREA URNS HPF: ABNORMAL /HPF (ref 0–5)
SODIUM SERPL-SCNC: 139 MMOL/L (ref 136–145)
SP GR UR REFRACTOMETRY: 1.02 (ref 1–1.03)
UROBILINOGEN UR QL STRIP.AUTO: 1 EU/DL (ref 0.2–1)
WBC # BLD AUTO: 6.3 K/UL (ref 4.6–13.2)
WBC URNS QL MICRO: ABNORMAL /HPF (ref 0–5)

## 2022-10-29 PROCEDURE — 83690 ASSAY OF LIPASE: CPT

## 2022-10-29 PROCEDURE — 74011250637 HC RX REV CODE- 250/637: Performed by: EMERGENCY MEDICINE

## 2022-10-29 PROCEDURE — 96374 THER/PROPH/DIAG INJ IV PUSH: CPT

## 2022-10-29 PROCEDURE — 85025 COMPLETE CBC W/AUTO DIFF WBC: CPT

## 2022-10-29 PROCEDURE — 81025 URINE PREGNANCY TEST: CPT

## 2022-10-29 PROCEDURE — 80076 HEPATIC FUNCTION PANEL: CPT

## 2022-10-29 PROCEDURE — 74011250636 HC RX REV CODE- 250/636: Performed by: EMERGENCY MEDICINE

## 2022-10-29 PROCEDURE — 80048 BASIC METABOLIC PNL TOTAL CA: CPT

## 2022-10-29 PROCEDURE — 81001 URINALYSIS AUTO W/SCOPE: CPT

## 2022-10-29 PROCEDURE — 99284 EMERGENCY DEPT VISIT MOD MDM: CPT

## 2022-10-29 RX ORDER — FAMOTIDINE 20 MG/1
20 TABLET, FILM COATED ORAL
Status: COMPLETED | OUTPATIENT
Start: 2022-10-29 | End: 2022-10-29

## 2022-10-29 RX ORDER — KETOROLAC TROMETHAMINE 30 MG/ML
30 INJECTION, SOLUTION INTRAMUSCULAR; INTRAVENOUS
Status: COMPLETED | OUTPATIENT
Start: 2022-10-29 | End: 2022-10-29

## 2022-10-29 RX ORDER — ONDANSETRON 2 MG/ML
4 INJECTION INTRAMUSCULAR; INTRAVENOUS
Status: DISCONTINUED | OUTPATIENT
Start: 2022-10-29 | End: 2022-10-30 | Stop reason: HOSPADM

## 2022-10-29 RX ORDER — ONDANSETRON 4 MG/1
4 TABLET, ORALLY DISINTEGRATING ORAL
Status: COMPLETED | OUTPATIENT
Start: 2022-10-29 | End: 2022-10-29

## 2022-10-29 RX ADMIN — ONDANSETRON 4 MG: 4 TABLET, ORALLY DISINTEGRATING ORAL at 22:56

## 2022-10-29 RX ADMIN — KETOROLAC TROMETHAMINE 30 MG: 30 INJECTION, SOLUTION INTRAMUSCULAR; INTRAVENOUS at 22:56

## 2022-10-29 RX ADMIN — SODIUM CHLORIDE 1000 ML: 900 INJECTION, SOLUTION INTRAVENOUS at 21:38

## 2022-10-29 RX ADMIN — FAMOTIDINE 20 MG: 20 TABLET, FILM COATED ORAL at 22:56

## 2022-10-30 NOTE — ED NOTES
AxOx4 pt arrived for chief complaint of vomiting x2 hours and RUQ pain x3 weeks. Signs and symptoms: reports solid loose stools after every meal. Denies constipation    Allergies: Penicillin    Medication: listed in medication section    PMH: Denies surgery, denies pregnancy, hx of duodenitis    Last oral intake: 1800    Event leading up to ED visit: vomiting after eating      Pt breath adequately with equal chest rise and fall with a good perfusing color. IV established and flushed for patency.     Pt placed on cardiac monitor

## 2022-10-30 NOTE — ED PROVIDER NOTES
53-year-old [place] to [place] emergency department with right upper quadrant pain. Patient was eaten [place] today with her boyfriend and the pain began. Pain was sharp nonradiating constant. She got nauseous and vomited. No lower abdominal pain no vaginal complaints no burning with urination no fevers or chills. Patient had a HIDA scan yesterday set up by her gastroenterologist.  Patient has had this belly pain for a while and is also told that it may be reflux. Patient has not taken anything for pain. No other issues expressed. History reviewed. No pertinent past medical history. Past Surgical History:   Procedure Laterality Date    HX ORTHOPAEDIC      right ACL    HX TYMPANOSTOMY           History reviewed. No pertinent family history.     Social History     Socioeconomic History    Marital status: SINGLE     Spouse name: Not on file    Number of children: Not on file    Years of education: Not on file    Highest education level: Not on file   Occupational History    Not on file   Tobacco Use    Smoking status: Never    Smokeless tobacco: Never   Vaping Use    Vaping Use: Never used   Substance and Sexual Activity    Alcohol use: Never    Drug use: Never    Sexual activity: Yes     Partners: Male     Birth control/protection: Injection, None   Other Topics Concern    Not on file   Social History Narrative    Not on file     Social Determinants of Health     Financial Resource Strain: Not on file   Food Insecurity: Not on file   Transportation Needs: Not on file   Physical Activity: Inactive    Days of Exercise per Week: 0 days    Minutes of Exercise per Session: 0 min   Stress: Not on file   Social Connections: Not on file   Intimate Partner Violence: Not At Risk    Fear of Current or Ex-Partner: No    Emotionally Abused: No    Physically Abused: No    Sexually Abused: No   Housing Stability: Not on file         ALLERGIES: Pcn [penicillins]    Review of Systems   Constitutional: Negative. HENT: Negative. Cardiovascular: Negative. Gastrointestinal:  Positive for abdominal distention, abdominal pain, nausea and vomiting. Negative for anal bleeding, blood in stool, constipation, diarrhea and rectal pain. Genitourinary: Negative. Neurological: Negative. Hematological: Negative. All other systems reviewed and are negative. Vitals:    10/29/22 2135   BP: 128/72   Pulse: 98   Resp: 18   Temp: 97.3 °F (36.3 °C)   SpO2: 99%   Weight: 62.1 kg (137 lb)   Height: 5' 7\" (1.702 m)            Physical Exam  Vitals and nursing note reviewed. Constitutional:       General: She is not in acute distress. Appearance: Normal appearance. She is not ill-appearing, toxic-appearing or diaphoretic. HENT:      Head: Normocephalic and atraumatic. Nose: Nose normal.      Mouth/Throat:      Mouth: Mucous membranes are moist.      Pharynx: No oropharyngeal exudate or posterior oropharyngeal erythema. Eyes:      Extraocular Movements: Extraocular movements intact. Cardiovascular:      Rate and Rhythm: Normal rate and regular rhythm. Pulmonary:      Effort: Pulmonary effort is normal.      Breath sounds: Normal breath sounds. Abdominal:      General: There is no distension. Palpations: Abdomen is soft. There is no mass. Tenderness: There is abdominal tenderness. There is no right CVA tenderness, left CVA tenderness, guarding or rebound. Hernia: No hernia is present. Musculoskeletal:         General: Normal range of motion. Cervical back: Normal range of motion and neck supple. Skin:     General: Skin is warm. Capillary Refill: Capillary refill takes less than 2 seconds. Coloration: Skin is not jaundiced or pale. Findings: No bruising, erythema, lesion or rash. Neurological:      General: No focal deficit present. Mental Status: She is alert and oriented to person, place, and time. Psychiatric:         Mood and Affect: Mood normal.         Behavior: Behavior normal.        MDM  Number of Diagnoses or Management Options  Hypoglycemia  RUQ pain  Diagnosis management comments: Patient has symptoms are classic for gallbladder disease. Patient had a normal HIDA scan yesterday I looked at her old CAT scans no signs of gallstones. Her LFTs are normal.  Her lipase is normal.  Patient is not pregnant. She is better after treatment with meds.   We will have her follow-up with her gastroenterologist.    Differential diagnosis UTI gallbladder disease cholecystitis pancreatitis           Procedures

## 2022-10-30 NOTE — DISCHARGE INSTRUCTIONS
Come back if you get worse. Follow-up without fail. Eat plenty of small meals. Please do not eat fatty foods.

## 2022-10-31 ENCOUNTER — PATIENT OUTREACH (OUTPATIENT)
Dept: OTHER | Age: 22
End: 2022-10-31

## 2022-10-31 NOTE — PROGRESS NOTES
[facility]     Call placed to pt, Verified  and address for HIPAA security. Purpose of TC    Pt was seen at [facility] ER 10/29/22    Diagnosis Comment   RUQ pain    Hypoglycemia      Pt was seen at [facility] ER 10/16/22     Diagnosis Comment   Abdominal pain, epigastric     Constipation, unspecified constipation type           TC details     Pt returned back to the ER 10/29/22. Pt reported ongoing issues with back, right shoulder, chest, bilateral hip pain in addition to RUQ pain 5/10 on pain scale, which is,\"stabbing/dull. \" Pt reported pain has been going on for a few weeks now. Pt reported she had a formed BM yesterday, denies blood, but does report mucus in stool. Pt denies issues with urination, fever or N/V. Appetite and hydration has been good. Pt has consumed 32 oz of water so far today. Pt reported she saw GI last week and was advised to follow-up in 1-2 months. Advised pt to follow-up with GI and or PCP this week. Plan of action  Provide support to patient. CM will follow-up in 1 week     Goals         Care Coordination related to abdominal pain (pt-stated)       Establish PCP relationships and regularly scheduled appointments. PCP - 10/24/22  GI - 10/19/22    10/24/22 Call placed to patient, no answer. 10/31/22  Pt was seen at [facility] ER 10/29/22  Diagnosis Comment   RUQ pain    Hypoglycemia    GI - attended appt 10/19/22 follow-up TBD  PCP - TBD             Knowledge and adherence to medication plan. Taking prescribed meds        Supportive resources in place to maintain patient in the community (ie., home health, equipment, DME, refer to, etc.)       [facility] - # 120 [facility]   Nurse Access line   If you have COVID-19 symptoms, have tested positive for COVID-19 or have been exposed to someone with COVID-19, call the Nurse Access Line at [phone] and select option 8.    Be Well  [address] [phone]  HR Service Now - [facility] Workday  IT - [phone]  [facility] Help - 1- [phone]  Leave of absence from work (other than jury duty and bereavement), call [phone] option 1 or call the dedicated line at [address] [place] [address] ([phone]). [employer] agents are available weekdays 8:30 a.m. - 10:30 p.m. ET. You also may: [place] for website access at [facility]. Download the [employer] mobile cathryn on or after Jan. 1 for on-the-go access to your team reports. Submit information by fax to [phone]. Life Matters - [phone] Go to [employer] on the Internet or your mobile device and enter the password [email or web address] to access resources, educational information, and self-service options.

## 2022-11-02 ENCOUNTER — VIRTUAL VISIT (OUTPATIENT)
Dept: INTERNAL MEDICINE CLINIC | Age: 22
End: 2022-11-02
Payer: COMMERCIAL

## 2022-11-02 DIAGNOSIS — R10.11 RIGHT UPPER QUADRANT ABDOMINAL PAIN: Primary | ICD-10-CM

## 2022-11-02 PROCEDURE — 99214 OFFICE O/P EST MOD 30 MIN: CPT | Performed by: NURSE PRACTITIONER

## 2022-11-02 RX ORDER — PROMETHAZINE HYDROCHLORIDE 25 MG/1
25 TABLET ORAL
Qty: 40 TABLET | Refills: 0 | Status: SHIPPED | OUTPATIENT
Start: 2022-11-02 | End: 2022-11-12

## 2022-11-02 RX ORDER — DULOXETIN HYDROCHLORIDE 20 MG/1
CAPSULE, DELAYED RELEASE ORAL
COMMUNITY
End: 2022-11-02 | Stop reason: SDUPTHER

## 2022-11-02 RX ORDER — PANTOPRAZOLE SODIUM 40 MG/1
40 TABLET, DELAYED RELEASE ORAL DAILY
Qty: 60 TABLET | Refills: 0 | Status: SHIPPED | OUTPATIENT
Start: 2022-11-02 | End: 2023-01-01

## 2022-11-02 RX ORDER — DULOXETIN HYDROCHLORIDE 20 MG/1
CAPSULE, DELAYED RELEASE ORAL
Qty: 60 CAPSULE | Refills: 0 | Status: SHIPPED | OUTPATIENT
Start: 2022-11-02

## 2022-11-04 ENCOUNTER — NURSE TRIAGE (OUTPATIENT)
Dept: OTHER | Facility: CLINIC | Age: 22
End: 2022-11-04

## 2022-11-04 ENCOUNTER — APPOINTMENT (OUTPATIENT)
Dept: ULTRASOUND IMAGING | Age: 22
End: 2022-11-04
Attending: EMERGENCY MEDICINE
Payer: COMMERCIAL

## 2022-11-04 ENCOUNTER — HOSPITAL ENCOUNTER (EMERGENCY)
Age: 22
Discharge: HOME | End: 2022-11-04
Attending: EMERGENCY MEDICINE
Payer: COMMERCIAL

## 2022-11-04 VITALS
HEIGHT: 67 IN | BODY MASS INDEX: 22.01 KG/M2 | SYSTOLIC BLOOD PRESSURE: 138 MMHG | DIASTOLIC BLOOD PRESSURE: 79 MMHG | WEIGHT: 140.21 LBS | HEART RATE: 94 BPM | RESPIRATION RATE: 14 BRPM | TEMPERATURE: 98.6 F | OXYGEN SATURATION: 99 %

## 2022-11-04 DIAGNOSIS — R10.11 RUQ PAIN: Primary | ICD-10-CM

## 2022-11-04 LAB
ALBUMIN SERPL-MCNC: 4.2 G/DL (ref 3.5–5)
ALBUMIN/GLOB SERPL: 1.2 {RATIO} (ref 1.1–2.2)
ALP SERPL-CCNC: 54 U/L (ref 45–117)
ALT SERPL-CCNC: 19 U/L (ref 12–78)
ANION GAP SERPL CALC-SCNC: 2 MMOL/L (ref 5–15)
APPEARANCE UR: CLEAR
AST SERPL-CCNC: 14 U/L (ref 15–37)
BACTERIA URNS QL MICRO: NEGATIVE /HPF
BASOPHILS # BLD: 0 K/UL (ref 0–0.1)
BASOPHILS NFR BLD: 0 % (ref 0–1)
BILIRUB SERPL-MCNC: 1.2 MG/DL (ref 0.2–1)
BILIRUB UR QL: NEGATIVE
BUN SERPL-MCNC: 8 MG/DL (ref 6–20)
BUN/CREAT SERPL: 10 (ref 12–20)
CALCIUM SERPL-MCNC: 8.9 MG/DL (ref 8.5–10.1)
CHLORIDE SERPL-SCNC: 110 MMOL/L (ref 97–108)
CO2 SERPL-SCNC: 28 MMOL/L (ref 21–32)
COLOR UR: ABNORMAL
CREAT SERPL-MCNC: 0.77 MG/DL (ref 0.55–1.02)
DIFFERENTIAL METHOD BLD: NORMAL
EOSINOPHIL # BLD: 0.1 K/UL (ref 0–0.4)
EOSINOPHIL NFR BLD: 1 % (ref 0–7)
EPITH CASTS URNS QL MICRO: ABNORMAL /LPF
ERYTHROCYTE [DISTWIDTH] IN BLOOD BY AUTOMATED COUNT: 12.1 % (ref 11.5–14.5)
GLOBULIN SER CALC-MCNC: 3.6 G/DL (ref 2–4)
GLUCOSE SERPL-MCNC: 77 MG/DL (ref 65–100)
GLUCOSE UR STRIP.AUTO-MCNC: NEGATIVE MG/DL
HCT VFR BLD AUTO: 38.1 % (ref 35–47)
HGB BLD-MCNC: 12.9 G/DL (ref 11.5–16)
HGB UR QL STRIP: NEGATIVE
IMM GRANULOCYTES # BLD AUTO: 0 K/UL (ref 0–0.04)
IMM GRANULOCYTES NFR BLD AUTO: 0 % (ref 0–0.5)
KETONES UR QL STRIP.AUTO: NEGATIVE MG/DL
LEUKOCYTE ESTERASE UR QL STRIP.AUTO: ABNORMAL
LIPASE SERPL-CCNC: 80 U/L (ref 73–393)
LYMPHOCYTES # BLD: 2 K/UL (ref 0.8–3.5)
LYMPHOCYTES NFR BLD: 36 % (ref 12–49)
MCH RBC QN AUTO: 28.4 PG (ref 26–34)
MCHC RBC AUTO-ENTMCNC: 33.9 G/DL (ref 30–36.5)
MCV RBC AUTO: 83.7 FL (ref 80–99)
MONOCYTES # BLD: 0.4 K/UL (ref 0–1)
MONOCYTES NFR BLD: 8 % (ref 5–13)
NEUTS SEG # BLD: 3.1 K/UL (ref 1.8–8)
NEUTS SEG NFR BLD: 55 % (ref 32–75)
NITRITE UR QL STRIP.AUTO: NEGATIVE
NRBC # BLD: 0 K/UL (ref 0–0.01)
NRBC BLD-RTO: 0 PER 100 WBC
PH UR STRIP: 7 [PH] (ref 5–8)
PLATELET # BLD AUTO: 265 K/UL (ref 150–400)
PMV BLD AUTO: 10.2 FL (ref 8.9–12.9)
POTASSIUM SERPL-SCNC: 3.6 MMOL/L (ref 3.5–5.1)
PROT SERPL-MCNC: 7.8 G/DL (ref 6.4–8.2)
PROT UR STRIP-MCNC: NEGATIVE MG/DL
RBC # BLD AUTO: 4.55 M/UL (ref 3.8–5.2)
RBC #/AREA URNS HPF: ABNORMAL /HPF (ref 0–5)
SODIUM SERPL-SCNC: 140 MMOL/L (ref 136–145)
SP GR UR REFRACTOMETRY: 1.01
UA: UC IF INDICATED,UAUC: ABNORMAL
UROBILINOGEN UR QL STRIP.AUTO: 1 EU/DL (ref 0.2–1)
WBC # BLD AUTO: 5.6 K/UL (ref 3.6–11)
WBC URNS QL MICRO: ABNORMAL /HPF (ref 0–4)

## 2022-11-04 PROCEDURE — 74011000250 HC RX REV CODE- 250: Performed by: EMERGENCY MEDICINE

## 2022-11-04 PROCEDURE — 74011250637 HC RX REV CODE- 250/637: Performed by: EMERGENCY MEDICINE

## 2022-11-04 PROCEDURE — 81001 URINALYSIS AUTO W/SCOPE: CPT

## 2022-11-04 PROCEDURE — 80053 COMPREHEN METABOLIC PANEL: CPT

## 2022-11-04 PROCEDURE — 99284 EMERGENCY DEPT VISIT MOD MDM: CPT

## 2022-11-04 PROCEDURE — 85025 COMPLETE CBC W/AUTO DIFF WBC: CPT

## 2022-11-04 PROCEDURE — 36415 COLL VENOUS BLD VENIPUNCTURE: CPT

## 2022-11-04 PROCEDURE — 76705 ECHO EXAM OF ABDOMEN: CPT

## 2022-11-04 PROCEDURE — 83690 ASSAY OF LIPASE: CPT

## 2022-11-04 RX ORDER — SUCRALFATE 1 G/10ML
1 SUSPENSION ORAL 4 TIMES DAILY
Qty: 560 ML | Refills: 0 | Status: SHIPPED | OUTPATIENT
Start: 2022-11-04 | End: 2022-11-18

## 2022-11-04 RX ORDER — HYOSCYAMINE SULFATE 0.12 MG/1
0.12 TABLET SUBLINGUAL
Qty: 70 TABLET | Refills: 0 | Status: SHIPPED | OUTPATIENT
Start: 2022-11-04 | End: 2022-11-18

## 2022-11-04 RX ADMIN — ALUMINUM HYDROXIDE AND MAGNESIUM HYDROXIDE 40 ML: 200; 200 SUSPENSION ORAL at 17:05

## 2022-11-04 NOTE — DISCHARGE INSTRUCTIONS
Start with either sucralfate or Gaviscon. Sucralfate as previously expensive you may take OTC Gaviscon. You may also trial hyoscyamine for ongoing abdominal pain. Follow-up with your GI physician as soon as able for past outpatient EGD.

## 2022-11-04 NOTE — ED PROVIDER NOTES
EMERGENCY DEPARTMENT HISTORY AND PHYSICAL EXAM      Date: 11/4/2022  Patient Name: Merced Baron  Patient Age and Sex: 23 y.o. female     History of Presenting Illness     Chief Complaint   Patient presents with    Abdominal Pain     Pt having pain stabbing RUQ  especially when she eats. and has a knot left side of abdomen. Pt was told to come to ED if has a bowel change. Passing loose yellow stool today for  aweek or two. . Had vomiting Saturday and was seen at [facility] for this. History Provided By: Patient    HPI: Merced Baron is a 23year old history chronic abdominal pain presenting with abdominal pain and stool changes. She reports her abdominal pain has been persistent, described as stabbing bilateral upper quadrants radiating to back and hips. She has been seen three times over the past three weeks, similar pain throughout this period. Pain is worse with eating anything. She noted stool appeared like yellow paper mache this morning. There are no other complaints, changes, or physical findings at this time. PCP: Savannah Ward NP    No current facility-administered medications on file prior to encounter. Current Outpatient Medications on File Prior to Encounter   Medication Sig Dispense Refill    promethazine (PHENERGAN) 25 mg tablet Take 1 Tablet by mouth every six (6) hours as needed for Nausea for up to 10 days. Indications: additional medications to treat pain 40 Tablet 0    DULoxetine (CYMBALTA) 20 mg capsule duloxetine 20 mg capsule,delayed release   TAKE 1 CAPSULE BY MOUTH ONCE DAILY  Indications: anxiousness associated with depression 60 Capsule 0    pantoprazole (PROTONIX) 40 mg tablet Take 1 Tablet by mouth daily for 60 days. Indications: heartburn 60 Tablet 0    medroxyPROGESTERone (DEPO-PROVERA) 150 mg/mL syrg 150 mg by IntraMUSCular route every three (3) months. Past History   Past Medical History:  No past medical history on file.     Past Surgical History:  Past Surgical History:   Procedure Laterality Date    HX ORTHOPAEDIC      right ACL    HX TYMPANOSTOMY       Family History:  No family history on file. Social History:  Social History     Tobacco Use    Smoking status: Never    Smokeless tobacco: Never   Vaping Use    Vaping Use: Never used   Substance Use Topics    Alcohol use: Never    Drug use: Never     Allergies: Allergies   Allergen Reactions    Pcn [Penicillins] Rash       Review of Systems   Review of Systems   Constitutional:  Negative for chills and fever. HENT:  Negative for congestion and rhinorrhea. Respiratory:  Negative for shortness of breath. Cardiovascular:  Negative for chest pain. Gastrointestinal:  Positive for abdominal pain, diarrhea and nausea. Negative for vomiting. Genitourinary:  Negative for dysuria and frequency. Musculoskeletal:  Negative for myalgias. All other systems reviewed and are negative. Physical Exam   Physical Exam  Vitals and nursing note reviewed. Constitutional:       General: She is not in acute distress. Appearance: Normal appearance. She is not ill-appearing. HENT:      Head: Normocephalic. Mouth/Throat:      Mouth: Mucous membranes are moist.   Eyes:      Conjunctiva/sclera: Conjunctivae normal.   Cardiovascular:      Rate and Rhythm: Normal rate and regular rhythm. Pulses: Normal pulses. Pulmonary:      Effort: Pulmonary effort is normal.      Breath sounds: Normal breath sounds. Abdominal:      General: Abdomen is flat. Palpations: Abdomen is soft. There is no mass. Tenderness: There is abdominal tenderness in the epigastric area. Negative signs include Mercado's sign and McBurney's sign. Musculoskeletal:         General: No deformity. Skin:     General: Skin is warm and dry. Neurological:      Mental Status: She is alert and oriented to person, place, and time. Mental status is at baseline.    Psychiatric:         Behavior: Behavior normal.         Thought Content: Thought content normal.        Diagnostic Study Results     Labs -     Recent Results (from the past 12 hour(s))   CBC WITH AUTOMATED DIFF    Collection Time: 11/04/22  3:28 PM   Result Value Ref Range    WBC 5.6 3.6 - 11.0 K/uL    RBC 4.55 3.80 - 5.20 M/uL    HGB 12.9 11.5 - 16.0 g/dL    HCT 38.1 35.0 - 47.0 %    MCV 83.7 80.0 - 99.0 FL    MCH 28.4 26.0 - 34.0 PG    MCHC 33.9 30.0 - 36.5 g/dL    RDW 12.1 11.5 - 14.5 %    PLATELET 139 492 - 678 K/uL    MPV 10.2 8.9 - 12.9 FL    NRBC 0.0 0  WBC    ABSOLUTE NRBC 0.00 0.00 - 0.01 K/uL    NEUTROPHILS 55 32 - 75 %    LYMPHOCYTES 36 12 - 49 %    MONOCYTES 8 5 - 13 %    EOSINOPHILS 1 0 - 7 %    BASOPHILS 0 0 - 1 %    IMMATURE GRANULOCYTES 0 0.0 - 0.5 %    ABS. NEUTROPHILS 3.1 1.8 - 8.0 K/UL    ABS. LYMPHOCYTES 2.0 0.8 - 3.5 K/UL    ABS. MONOCYTES 0.4 0.0 - 1.0 K/UL    ABS. EOSINOPHILS 0.1 0.0 - 0.4 K/UL    ABS. BASOPHILS 0.0 0.0 - 0.1 K/UL    ABS. IMM. GRANS. 0.0 0.00 - 0.04 K/UL    DF AUTOMATED     METABOLIC PANEL, COMPREHENSIVE    Collection Time: 11/04/22  3:28 PM   Result Value Ref Range    Sodium 140 136 - 145 mmol/L    Potassium 3.6 3.5 - 5.1 mmol/L    Chloride 110 (H) 97 - 108 mmol/L    CO2 28 21 - 32 mmol/L    Anion gap 2 (L) 5 - 15 mmol/L    Glucose 77 65 - 100 mg/dL    BUN 8 6 - 20 MG/DL    Creatinine 0.77 0.55 - 1.02 MG/DL    BUN/Creatinine ratio 10 (L) 12 - 20      eGFR >60 >60 ml/min/1.73m2    Calcium 8.9 8.5 - 10.1 MG/DL    Bilirubin, total 1.2 (H) 0.2 - 1.0 MG/DL    ALT (SGPT) 19 12 - 78 U/L    AST (SGOT) 14 (L) 15 - 37 U/L    Alk.  phosphatase 54 45 - 117 U/L    Protein, total 7.8 6.4 - 8.2 g/dL    Albumin 4.2 3.5 - 5.0 g/dL    Globulin 3.6 2.0 - 4.0 g/dL    A-G Ratio 1.2 1.1 - 2.2     LIPASE    Collection Time: 11/04/22  3:28 PM   Result Value Ref Range    Lipase 80 73 - 393 U/L   URINALYSIS W/ REFLEX CULTURE    Collection Time: 11/04/22  3:29 PM    Specimen: Urine   Result Value Ref Range    Color YELLOW/STRAW Appearance CLEAR CLEAR      Specific gravity 1.013      pH (UA) 7.0 5.0 - 8.0      Protein Negative NEG mg/dL    Glucose Negative NEG mg/dL    Ketone Negative NEG mg/dL    Bilirubin Negative NEG      Blood Negative NEG      Urobilinogen 1.0 0.2 - 1.0 EU/dL    Nitrites Negative NEG      Leukocyte Esterase TRACE (A) NEG      WBC 0-4 0 - 4 /hpf    RBC 0-5 0 - 5 /hpf    Epithelial cells FEW FEW /lpf    Bacteria Negative NEG /hpf    UA:UC IF INDICATED CULTURE NOT INDICATED BY UA RESULT CNI         Radiologic Studies -   US ABD LTD   Final Result   Unremarkable ultrasound of the right upper quadrant. In particular   there are no gallstones or biliary ductal dilatation. CT Results  (Last 48 hours)      None          CXR Results  (Last 48 hours)      None              Medical Decision Making   I am the first provider for this patient. I reviewed the vital signs, available nursing notes, past medical history, past surgical history, family history and social history. Vital Signs-Reviewed the patient's vital signs. Patient Vitals for the past 12 hrs:   Temp Pulse Resp BP SpO2   11/04/22 1441 98.6 °F (37 °C) 94 14 138/79 99 %       Records Reviewed: Nursing Notes and Old Medical Records    Followed by Consuelo Chapman of gastroenterology, taking pantoprazole and phenergan. Provider Notes (Medical Decision Making):   DDx gastritis, pancreatitis, cholecystitis    Pain is typical for cholecystitis however has received extensive work-up for this without any abnormalities noted particularly no gallstones. This includes a HIDA scan recently. Will give medications for gastritis, check CBC CMP and reevaluate. ED Course:   Initial assessment performed. The patients presenting problems have been discussed, and they are in agreement with the care plan formulated and outlined with them. I have encouraged them to ask questions as they arise throughout their visit.     ED Course as of 11/04/22 1704   Fri Nov 04, 2022   1626 On chart review patient had HIDA scan performed 10/28, CT scans in the past that demonstrated no gallstones [WB]   1626 Bilirubin is minimally elevated 1.2 other LFTs are normal [WB]   1753 FINDINGS: The liver is unremarkable. No evidence of a focal liver lesion. The  portal vein is patent with flow towards the liver. The diameter of the portal  vein measures 0.6 cm. The velocity measures 30 cm/sec. The gallbladder is contracted. There are no gallstones, gallbladder wall  thickening or pericholecystic fluid. There is no intra or extrahepatic biliary  ductal dilatation. The common bile duct measures 3 mm. The visualized pancreas and right kidney are within normal limits. [WB]   1802 Ongoing abdominal pain sharp in the right upper quadrant, will discharge on Gaviscon, sucralfate, GI follow-up [WB]      ED Course User Index  [WB] Sheryl Multani MD     Disposition:  Discharge Note:  The patient has been re-evaluated and is ready for discharge. Reviewed available results with patient. Counseled patient on diagnosis and care plan. Patient has expressed understanding, and all questions have been answered. Patient agrees with plan and agrees to follow up as recommended, or to return to the ED if their symptoms worsen. Discharge instructions have been provided and explained to the patient, along with reasons to return to the ED. PLAN:  Discharge Medication List as of 11/4/2022  6:14 PM        START taking these medications    Details   sucralfate (CARAFATE) 100 mg/mL suspension Take 10 mL by mouth four (4) times daily for 14 days. , Normal, Disp-560 mL, R-0      aluminum hydrox-magnesium carb (Gaviscon)  mg/15 mL suspension Take 15 mL by mouth every six (6) hours as needed for Indigestion for up to 30 days. , Normal, Disp-355 mL, R-0      hyoscyamine SL (LEVSIN/SL) 0.125 mg SL tablet 1 Tablet by SubLINGual route every four (4) hours as needed for PRN Reason (Other) (abdominal pain) for up to 14 days., Normal, Disp-70 Tablet, R-0           CONTINUE these medications which have NOT CHANGED    Details   promethazine (PHENERGAN) 25 mg tablet Take 1 Tablet by mouth every six (6) hours as needed for Nausea for up to 10 days. Indications: additional medications to treat pain, Normal, Disp-40 Tablet, R-0      DULoxetine (CYMBALTA) 20 mg capsule duloxetine 20 mg capsule,delayed release   TAKE 1 CAPSULE BY MOUTH ONCE DAILY  Indications: anxiousness associated with depression, Print, Disp-60 Capsule, R-0      pantoprazole (PROTONIX) 40 mg tablet Take 1 Tablet by mouth daily for 60 days. Indications: heartburn, Normal, Disp-60 Tablet, R-0      medroxyPROGESTERone (DEPO-PROVERA) 150 mg/mL syrg 150 mg by IntraMUSCular route every three (3) months., Historical Med           2. Follow-up Information       Follow up With Specialties Details Why Contact Info    Follow-up with GI as soon as able              3.  Return to ED if worse     Diagnosis     Clinical Impression:   1. RUQ pain        Attestations:    Bin Dickson M.D. Please note that this dictation was completed with [employer], the [employer] voice recognition software. Quite often unanticipated grammatical, syntax, homophones, and other interpretive errors are inadvertently transcribed by the computer software. Please disregard these errors. Please excuse any errors that have escaped final proofreading. Thank you.

## 2022-11-04 NOTE — TELEPHONE ENCOUNTER
Was referred to ED by her PCP  Low Blanco, ANGELA. Is headed to [facility] ED now. Advised that this will be noted in chart but up to [facility] how they charge.   Reason for Disposition   Caller has already spoken with the PCP (or office), and has no further questions    Protocols used: No Contact or Duplicate Contact Call-ADULT-[place]

## 2022-11-06 NOTE — PROGRESS NOTES
Juan Antonio Weathers (: 2000) is a 23 y.o. female, established patient, here for evaluation of the following chief complaint(s):   Abdominal Pain (Stabbing pain in upper abdomen)       ASSESSMENT/PLAN:  Below is the assessment and plan developed based on review of pertinent history, labs, studies, and medications. 1. Right upper quadrant abdominal pain    Return if symptoms worsen or fail to improve. SUBJECTIVE/OBJECTIVE:  HPI  Pt has had recurring RUQ abd pain for several weeks. Pt was seen in the ER and by her GI physician with neg work up. HIDA scan ordered by her [address] [place] [address] physician which was unremarkable. Pt has appt today for increased pain again especially after eating. Review of Systems   Pt describes stabbing pain and clear mucous from rectum the consistency of jelly PT denies fever has occasional vomiting no diarrhea. Pt states the bentyl did not help. Will prescribe phenergan and refill Protonix and Cymbalta,   No data recorded     Physical Exam    [INSTRUCTIONS:  \"[x]\" Indicates a positive item  \"[]\" Indicates a negative item  -- DELETE ALL ITEMS NOT EXAMINED]    Constitutional: [x] Appears well-developed and well-nourished [x] No apparent distress . Pt does appear uncomfortable.     [] Abnormal -     Mental status: [x] Alert and awake  [x] Oriented to person/place/time [x] Able to follow commands    [] Abnormal -     Eyes:   EOM    [x]  Normal    [] Abnormal -   Sclera  [x]  Normal    [] Abnormal -          Discharge [x]  None visible   [] Abnormal -     HENT: [x] Normocephalic, atraumatic  [] Abnormal -   [x] Mouth/Throat: Mucous membranes are moist    External Ears [x] Normal  [] Abnormal -    Neck: [x] No visualized mass [] Abnormal -     Pulmonary/Chest: [x] Respiratory effort normal   [x] No visualized signs of difficulty breathing or respiratory distress        [] Abnormal -      Musculoskeletal:   [x] Normal gait with no signs of ataxia         [x] Normal range of motion of neck        [] Abnormal -     Neurological:        [x] No Facial Asymmetry (Cranial nerve 7 motor function) (limited exam due to video visit)          [x] No gaze palsy        [] Abnormal -          Skin:        [x] No significant exanthematous lesions or discoloration noted on facial skin         [] Abnormal -            Psychiatric:       [x] Normal Affect [] Abnormal -        [x] No Hallucinations    Other pertinent observable physical exam findings:-    On this date 11/02/2022 I have spent 30 minutes reviewing previous notes, test results and face to face (virtual) with the patient discussing the diagnosis and importance of compliance with the treatment plan as well as documenting on the day of the visit. Rosana Almonte, was evaluated through a synchronous (real-time) audio-video encounter. The patient (or guardian if applicable) is aware that this is a billable service, which includes applicable co-pays. This Virtual Visit was conducted with patient's (and/or legal guardian's) consent. The visit was conducted pursuant to the emergency declaration under the [address] [place] [address] authority and the [employer] and [employer] General Act. Patient identification was verified, and a caregiver was present when appropriate. The patient was located at: Home: [address] [place] [address]  The provider was located at: Facility ([facility]): [place] [id number]       An electronic signature was used to authenticate this note.   -- Low Blanco NP

## 2022-11-07 ENCOUNTER — PATIENT OUTREACH (OUTPATIENT)
Dept: OTHER | Age: 22
End: 2022-11-07

## 2022-11-07 NOTE — PROGRESS NOTES
[facility] Outreach     Call placed to pt, Verified  and address for HIPAA security. Purpose of TC    Pt was seen at [facility] ER 22  Diagnosis: RUQ pain    Pt was seen at [facility] ER 10/29/22     Diagnosis Comment   RUQ pain     Hypoglycemia        Pt was seen at [facility] ER 10/16/22     Diagnosis Comment   Abdominal pain, epigastric     Constipation, unspecified constipation type           TC details     Pt returned back to the ER 22 for the same ongoing s/s. Pt reported ongoing issues with back, right shoulder, chest, bilateral hip pain in addition to RUQ pain. Pt denies pain at this time. Pt reported pain typically returns when she eats. Pt reported she has been primarily consuming a liquid diet. Meds - pt was prescribed Carafate, gaviscon and hyoscyamine SL in the ER, but pt reported she only received the Carafate from [facility]. Pt will call shortly to confirm receiving script for other meds. Per [facility] scripts were received. Pt also reported she ran out of [facility] last week after attempting to refill at [facility]. Informed pt she will need to fill with harness. PCP - 11/15/22    GI - TBD. Pt was last seen 10/19/22 and was told to follow-up in [place] next available appt. This CM will call office to obtain an earlier appt. Plan of action  Provide support to patient. CM will follow-up in 1 day  Call for earlier GI appt      Goals         Care Coordination related to abdominal pain (pt-stated)       Establish PCP relationships and regularly scheduled appointments. PCP - 10/24/22  GI - 10/19/22    10/24/22 Call placed to patient, no answer. 10/31/22  Pt was seen at [facility] ER 10/29/22  Diagnosis Comment   RUQ pain    Hypoglycemia    GI - attended appt 10/19/22 follow-up TBD  PCP - TBD     10/7/22  Pt was seen at [facility] ER 22  Diagnosis: RUQ pain  PCP - 11/15/22  GI - Arjun, but CM will call for an appt ASAP          Knowledge and adherence to medication plan.        Taking prescribed meds        Supportive resources in place to maintain patient in the community (ie., home health, equipment, DME, refer to, etc.)       [facility] - # [phone] 24/7  Nurse Access line 24/7  If you have COVID-19 symptoms, have tested positive for COVID-19 or have been exposed to someone with COVID-19, call the Nurse Access Line at [phone] and select option 8. Be Well  [address] [facility] [address] Urgent [facility] [phone]  HR Service Now - [facility] Workday  IT - [phone]  MyChart Help - 1- [phone]  Leave of absence from work (other than jury duty and bereavement), call [phone] option 1 or call the dedicated line at [phone] ([phone]). [employer] agents are available weekdays 8:30 a.m. - 10:30 p.m. ET. You also may: [place] for website access at [facility]. Download the [employer] mobile cathryn on or after Jan. 1 for on-the-go access to your team reports. Submit information by fax to [phone]. [employer] - [phone] Go to [employer] on the Internet or your mobile device and enter the password [email or web address] to access resources, educational information, and self-service options.

## 2022-11-08 ENCOUNTER — PATIENT OUTREACH (OUTPATIENT)
Dept: OTHER | Age: 22
End: 2022-11-08

## 2022-11-08 ENCOUNTER — APPOINTMENT (OUTPATIENT)
Dept: CT IMAGING | Age: 22
End: 2022-11-08
Attending: EMERGENCY MEDICINE
Payer: COMMERCIAL

## 2022-11-08 ENCOUNTER — HOSPITAL ENCOUNTER (EMERGENCY)
Age: 22
Discharge: HOME | End: 2022-11-08
Attending: EMERGENCY MEDICINE
Payer: COMMERCIAL

## 2022-11-08 VITALS
HEIGHT: 67 IN | DIASTOLIC BLOOD PRESSURE: 73 MMHG | HEART RATE: 94 BPM | WEIGHT: 137 LBS | TEMPERATURE: 98.4 F | OXYGEN SATURATION: 98 % | SYSTOLIC BLOOD PRESSURE: 119 MMHG | BODY MASS INDEX: 21.5 KG/M2 | RESPIRATION RATE: 18 BRPM

## 2022-11-08 DIAGNOSIS — K58.1 IRRITABLE BOWEL SYNDROME WITH CONSTIPATION: Primary | ICD-10-CM

## 2022-11-08 LAB
ALBUMIN SERPL-MCNC: 4 G/DL (ref 3.5–5)
ALBUMIN/GLOB SERPL: 1.2 {RATIO} (ref 1.1–2.2)
ALP SERPL-CCNC: 50 U/L (ref 45–117)
ALT SERPL-CCNC: 20 U/L (ref 12–78)
ANION GAP SERPL CALC-SCNC: 4 MMOL/L (ref 5–15)
APPEARANCE UR: CLEAR
AST SERPL-CCNC: 18 U/L (ref 15–37)
BASOPHILS # BLD: 0 K/UL (ref 0–0.1)
BASOPHILS NFR BLD: 0 % (ref 0–1)
BILIRUB SERPL-MCNC: 0.7 MG/DL (ref 0.2–1)
BILIRUB UR QL: NEGATIVE
BUN SERPL-MCNC: 6 MG/DL (ref 6–20)
BUN/CREAT SERPL: 8 (ref 12–20)
CALCIUM SERPL-MCNC: 9 MG/DL (ref 8.5–10.1)
CHLORIDE SERPL-SCNC: 110 MMOL/L (ref 97–108)
CO2 SERPL-SCNC: 25 MMOL/L (ref 21–32)
COLOR UR: NORMAL
CREAT SERPL-MCNC: 0.75 MG/DL (ref 0.55–1.02)
DIFFERENTIAL METHOD BLD: ABNORMAL
EOSINOPHIL # BLD: 0.1 K/UL (ref 0–0.4)
EOSINOPHIL NFR BLD: 1 % (ref 0–7)
ERYTHROCYTE [DISTWIDTH] IN BLOOD BY AUTOMATED COUNT: 12.1 % (ref 11.5–14.5)
GLOBULIN SER CALC-MCNC: 3.3 G/DL (ref 2–4)
GLUCOSE SERPL-MCNC: 84 MG/DL (ref 65–100)
GLUCOSE UR STRIP.AUTO-MCNC: NEGATIVE MG/DL
HCG UR QL: NEGATIVE
HCT VFR BLD AUTO: 34.4 % (ref 35–47)
HGB BLD-MCNC: 11.8 G/DL (ref 11.5–16)
HGB UR QL STRIP: NEGATIVE
IMM GRANULOCYTES # BLD AUTO: 0 K/UL (ref 0–0.04)
IMM GRANULOCYTES NFR BLD AUTO: 0 % (ref 0–0.5)
KETONES UR QL STRIP.AUTO: NEGATIVE MG/DL
LEUKOCYTE ESTERASE UR QL STRIP.AUTO: NEGATIVE
LIPASE SERPL-CCNC: 79 U/L (ref 73–393)
LYMPHOCYTES # BLD: 2.8 K/UL (ref 0.8–3.5)
LYMPHOCYTES NFR BLD: 51 % (ref 12–49)
MCH RBC QN AUTO: 29.1 PG (ref 26–34)
MCHC RBC AUTO-ENTMCNC: 34.3 G/DL (ref 30–36.5)
MCV RBC AUTO: 84.7 FL (ref 80–99)
MONOCYTES # BLD: 0.4 K/UL (ref 0–1)
MONOCYTES NFR BLD: 7 % (ref 5–13)
NEUTS SEG # BLD: 2.3 K/UL (ref 1.8–8)
NEUTS SEG NFR BLD: 41 % (ref 32–75)
NITRITE UR QL STRIP.AUTO: NEGATIVE
NRBC # BLD: 0 K/UL (ref 0–0.01)
NRBC BLD-RTO: 0 PER 100 WBC
PH UR STRIP: 6 [PH] (ref 5–8)
PLATELET # BLD AUTO: 229 K/UL (ref 150–400)
PMV BLD AUTO: 10.3 FL (ref 8.9–12.9)
POTASSIUM SERPL-SCNC: 3.9 MMOL/L (ref 3.5–5.1)
PROT SERPL-MCNC: 7.3 G/DL (ref 6.4–8.2)
PROT UR STRIP-MCNC: NEGATIVE MG/DL
RBC # BLD AUTO: 4.06 M/UL (ref 3.8–5.2)
SODIUM SERPL-SCNC: 139 MMOL/L (ref 136–145)
SP GR UR REFRACTOMETRY: 1.02 (ref 1–1.03)
UROBILINOGEN UR QL STRIP.AUTO: 0.2 EU/DL (ref 0.2–1)
WBC # BLD AUTO: 5.6 K/UL (ref 3.6–11)

## 2022-11-08 PROCEDURE — 74011000636 HC RX REV CODE- 636: Performed by: EMERGENCY MEDICINE

## 2022-11-08 PROCEDURE — 74177 CT ABD & PELVIS W/CONTRAST: CPT

## 2022-11-08 PROCEDURE — 81003 URINALYSIS AUTO W/O SCOPE: CPT

## 2022-11-08 PROCEDURE — 99285 EMERGENCY DEPT VISIT HI MDM: CPT

## 2022-11-08 PROCEDURE — 85025 COMPLETE CBC W/AUTO DIFF WBC: CPT

## 2022-11-08 PROCEDURE — 96374 THER/PROPH/DIAG INJ IV PUSH: CPT

## 2022-11-08 PROCEDURE — 74011250636 HC RX REV CODE- 250/636: Performed by: EMERGENCY MEDICINE

## 2022-11-08 PROCEDURE — 80053 COMPREHEN METABOLIC PANEL: CPT

## 2022-11-08 PROCEDURE — 81025 URINE PREGNANCY TEST: CPT

## 2022-11-08 PROCEDURE — 83690 ASSAY OF LIPASE: CPT

## 2022-11-08 PROCEDURE — 36415 COLL VENOUS BLD VENIPUNCTURE: CPT

## 2022-11-08 PROCEDURE — 96375 TX/PRO/DX INJ NEW DRUG ADDON: CPT

## 2022-11-08 PROCEDURE — 74011250637 HC RX REV CODE- 250/637: Performed by: EMERGENCY MEDICINE

## 2022-11-08 RX ORDER — MORPHINE SULFATE 2 MG/ML
4 INJECTION, SOLUTION INTRAMUSCULAR; INTRAVENOUS
Status: COMPLETED | OUTPATIENT
Start: 2022-11-08 | End: 2022-11-08

## 2022-11-08 RX ORDER — ONDANSETRON 2 MG/ML
4 INJECTION INTRAMUSCULAR; INTRAVENOUS
Status: COMPLETED | OUTPATIENT
Start: 2022-11-08 | End: 2022-11-08

## 2022-11-08 RX ADMIN — ALUMINUM HYDROXIDE AND MAGNESIUM HYDROXIDE 30 ML: 200; 200 SUSPENSION ORAL at 03:39

## 2022-11-08 RX ADMIN — MORPHINE SULFATE 4 MG: 2 INJECTION, SOLUTION INTRAMUSCULAR; INTRAVENOUS at 03:39

## 2022-11-08 RX ADMIN — IOPAMIDOL 100 ML: 755 INJECTION, SOLUTION INTRAVENOUS at 04:19

## 2022-11-08 RX ADMIN — ONDANSETRON 4 MG: 2 INJECTION INTRAMUSCULAR; INTRAVENOUS at 03:39

## 2022-11-08 NOTE — ED PROVIDER NOTES
EMERGENCY DEPARTMENT HISTORY AND PHYSICAL EXAM           Date: 11/8/2022  Patient Name: Tato Reynolds  Patient Age and Sex: 23 y.o. female  MRN:  [id number]  CSN:  [id number]    History of Presenting Illness     Chief Complaint   Patient presents with    Abdominal Pain     RUQ spasm and pain on and off x 1 week. Became intense while sitting at desk. +nausea no vomiting. Stool are yellow started last Friday. History Provided By: Patient    Ability to gather history was limited by:     HPI: Tato Reynolds, 23 y.o. female with history of constipation, chronic abdominal pain and complaints, complains of diffuse abdominal bloating and spasming, especially across the upper abdomen. Has been present on and off for about 1 month, has had multiple recent ED visits, also a HIDA scan and right upper quadrant ultrasound which were unremarkable. CT scan earlier this year also relatively unremarkable. Occasional postprandial vomiting. No fevers. No  symptoms. Her symptoms are described as moderate to severe. Location:    Quality:      Severity:    Duration:   Timing:      Context:    Modifying factors:   Associated symptoms:     Past History     The patient's medical, surgical, and social history on file were reviewed by me today. The family history was reviewed by me today and was non-contributory, unless otherwise specified below:    Past Medical History:  No past medical history on file. Past Surgical History:  Past Surgical History:   Procedure Laterality Date    HX ORTHOPAEDIC      right ACL    HX TYMPANOSTOMY         Family History:  No family history on file. Social History:  Social History     Tobacco Use    Smoking status: Never    Smokeless tobacco: Never   Vaping Use    Vaping Use: Never used   Substance Use Topics    Alcohol use: Never    Drug use: Never       Current Medications:  No current facility-administered medications on file prior to encounter.      Current Outpatient Medications on File Prior to Encounter   Medication Sig Dispense Refill    DULoxetine (CYMBALTA) 20 mg capsule duloxetine 20 mg capsule,delayed release   TAKE 1 CAPSULE BY MOUTH ONCE DAILY  Indications: anxiousness associated with depression 60 Capsule 0    medroxyPROGESTERone (DEPO-PROVERA) 150 mg/mL syrg 150 mg by IntraMUSCular route every three (3) months. sucralfate (CARAFATE) 100 mg/mL suspension Take 10 mL by mouth four (4) times daily for 14 days. (Patient not taking: Reported on 11/8/2022) 560 mL 0    aluminum hydrox-magnesium carb (Gaviscon)  mg/15 mL suspension Take 15 mL by mouth every six (6) hours as needed for Indigestion for up to 30 days. (Patient not taking: Reported on 11/8/2022) 355 mL 0    hyoscyamine SL (LEVSIN/SL) 0.125 mg SL tablet 1 Tablet by SubLINGual route every four (4) hours as needed for PRN Reason (Other) (abdominal pain) for up to 14 days. (Patient not taking: Reported on 11/8/2022) 70 Tablet 0    promethazine (PHENERGAN) 25 mg tablet Take 1 Tablet by mouth every six (6) hours as needed for Nausea for up to 10 days. Indications: additional medications to treat pain (Patient not taking: Reported on 11/8/2022) 40 Tablet 0    pantoprazole (PROTONIX) 40 mg tablet Take 1 Tablet by mouth daily for 60 days. Indications: heartburn (Patient not taking: Reported on 11/8/2022) 60 Tablet 0       Allergies: Allergies   Allergen Reactions    Pcn [Penicillins] Rash     Review of Systems   A complete ROS was reviewed by me today and was negative, unless otherwise specified below:    Review of Systems   Gastrointestinal:  Positive for abdominal pain, constipation and vomiting. All other systems reviewed and are negative.     Physical Exam   Vital Signs  Patient Vitals for the past 8 hrs:   Temp Pulse Resp BP SpO2   11/08/22 0430 -- -- -- 120/79 99 %   11/08/22 0330 -- -- -- 125/79 99 %   11/08/22 0250 -- -- -- 119/82 --   11/08/22 0143 98.4 °F (36.9 °C) 94 18 (!) 148/90 100 %          Physical Exam  Vitals and nursing note reviewed. Constitutional:       General: She is not in acute distress. Appearance: Normal appearance. She is well-developed. She is not ill-appearing. HENT:      Head: Normocephalic and atraumatic. Mouth/Throat:      Mouth: Mucous membranes are moist.   Eyes:      General:         Right eye: No discharge. Left eye: No discharge. Conjunctiva/sclera: Conjunctivae normal.   Cardiovascular:      Rate and Rhythm: Normal rate and regular rhythm. Heart sounds: Normal heart sounds. No murmur heard. Pulmonary:      Effort: Pulmonary effort is normal. No respiratory distress. Breath sounds: Normal breath sounds. No wheezing. Abdominal:      General: There is distension (Mild distention). Palpations: Abdomen is soft. Tenderness: There is generalized abdominal tenderness. Musculoskeletal:         General: No deformity. Normal range of motion. Cervical back: Normal range of motion and neck supple. Skin:     General: Skin is warm and dry. Findings: No rash. Neurological:      General: No focal deficit present. Mental Status: She is alert and oriented to person, place, and time. Psychiatric:         Mood and Affect: Mood is anxious.          Speech: Speech normal.         Behavior: Behavior normal.         Cognition and Memory: Cognition normal.       Diagnostic Study Results   Labs  Recent Results (from the past 24 hour(s))   CBC WITH AUTOMATED DIFF    Collection Time: 11/08/22  1:50 AM   Result Value Ref Range    WBC 5.6 3.6 - 11.0 K/uL    RBC 4.06 3.80 - 5.20 M/uL    HGB 11.8 11.5 - 16.0 g/dL    HCT 34.4 (L) 35.0 - 47.0 %    MCV 84.7 80.0 - 99.0 FL    MCH 29.1 26.0 - 34.0 PG    MCHC 34.3 30.0 - 36.5 g/dL    RDW 12.1 11.5 - 14.5 %    PLATELET 741 001 - 335 K/uL    MPV 10.3 8.9 - 12.9 FL    NRBC 0.0 0  WBC    ABSOLUTE NRBC 0.00 0.00 - 0.01 K/uL    NEUTROPHILS 41 32 - 75 %    LYMPHOCYTES 51 (H) 12 - 49 %    MONOCYTES 7 5 - 13 %    EOSINOPHILS 1 0 - 7 %    BASOPHILS 0 0 - 1 %    IMMATURE GRANULOCYTES 0 0.0 - 0.5 %    ABS. NEUTROPHILS 2.3 1.8 - 8.0 K/UL    ABS. LYMPHOCYTES 2.8 0.8 - 3.5 K/UL    ABS. MONOCYTES 0.4 0.0 - 1.0 K/UL    ABS. EOSINOPHILS 0.1 0.0 - 0.4 K/UL    ABS. BASOPHILS 0.0 0.0 - 0.1 K/UL    ABS. IMM. GRANS. 0.0 0.00 - 0.04 K/UL    DF AUTOMATED     METABOLIC PANEL, COMPREHENSIVE    Collection Time: 11/08/22  1:50 AM   Result Value Ref Range    Sodium 139 136 - 145 mmol/L    Potassium 3.9 3.5 - 5.1 mmol/L    Chloride 110 (H) 97 - 108 mmol/L    CO2 25 21 - 32 mmol/L    Anion gap 4 (L) 5 - 15 mmol/L    Glucose 84 65 - 100 mg/dL    BUN 6 6 - 20 MG/DL    Creatinine 0.75 0.55 - 1.02 MG/DL    BUN/Creatinine ratio 8 (L) 12 - 20      eGFR >60 >60 ml/min/1.73m2    Calcium 9.0 8.5 - 10.1 MG/DL    Bilirubin, total 0.7 0.2 - 1.0 MG/DL    ALT (SGPT) 20 12 - 78 U/L    AST (SGOT) 18 15 - 37 U/L    Alk. phosphatase 50 45 - 117 U/L    Protein, total 7.3 6.4 - 8.2 g/dL    Albumin 4.0 3.5 - 5.0 g/dL    Globulin 3.3 2.0 - 4.0 g/dL    A-G Ratio 1.2 1.1 - 2.2     URINALYSIS W/ RFLX MICROSCOPIC    Collection Time: 11/08/22  1:50 AM   Result Value Ref Range    Color YELLOW/STRAW      Appearance CLEAR CLEAR      Specific gravity 1.020 1.003 - 1.030      pH (UA) 6.0 5.0 - 8.0      Protein Negative NEG mg/dL    Glucose Negative NEG mg/dL    Ketone Negative NEG mg/dL    Bilirubin Negative NEG      Blood Negative NEG      Urobilinogen 0.2 0.2 - 1.0 EU/dL    Nitrites Negative NEG      Leukocyte Esterase Negative NEG     LIPASE    Collection Time: 11/08/22  1:50 AM   Result Value Ref Range    Lipase 79 73 - 393 U/L   HCG URINE, QL. - POC    Collection Time: 11/08/22  2:00 AM   Result Value Ref Range    Pregnancy test,urine (POC) Negative NEG         Radiologic Studies  CT ABD PELV W CONT   Final Result   No acute intra-abdominal pathology. No evidence of bowel wall thickening.    Moderate stool burden        CT Results  (Last 48 hours) 11/08/22 0419  CT ABD PELV W CONT Final result    Impression:  No acute intra-abdominal pathology. No evidence of bowel wall thickening. Moderate stool burden       Narrative:  EXAM: CT ABD PELV W CONT       INDICATION: Diffuse abd pain, vomiting, x4 weeks. Recurrent. Likely IBS or   constipation       COMPARISON: 6/29/2022        CONTRAST: 100 mL of Isovue-370. TECHNIQUE:    Following the uneventful intravenous administration of contrast, thin axial   images were obtained through the abdomen and pelvis. Coronal and sagittal   reconstructions were generated. Oral contrast was not administered. CT dose   reduction was achieved through use of a standardized protocol tailored for this   examination and automatic exposure control for dose modulation. FINDINGS:    LOWER THORAX: No significant abnormality in the incidentally imaged lower chest.   LIVER: No mass. BILIARY TREE: Gallbladder is within normal limits. CBD is not dilated. SPLEEN: within normal limits. PANCREAS: No mass or ductal dilatation. ADRENALS: Unremarkable. KIDNEYS: No mass, calculus, or hydronephrosis. STOMACH: Unremarkable. SMALL BOWEL: No dilatation or wall thickening. COLON: No dilatation or wall thickening. Moderate stool burden   APPENDIX: Not visualized   PERITONEUM: Small amount of free fluid in the pelvis likely physiologic. RETROPERITONEUM: No lymphadenopathy or aortic aneurysm. REPRODUCTIVE ORGANS: Unremarkable   URINARY BLADDER: No mass or calculus. BONES: No destructive bone lesion. ABDOMINAL WALL: No mass or hernia.    ADDITIONAL COMMENTS: N/A                 CXR Results  (Last 48 hours)      None            Billable Procedures   EKG reviewed by ED Physician in the absence of a cardiologist: Yes  EKG below was interpreted by Claudine Paiz MD    Procedures    Medical Decision Making     I reviewed the patient's most recent Emergency Dept notes and diagnostic tests in formulating my MDM on today's visit.    Provider Notes (Medical Decision Making):   24-year-old female complaining of acute on chronic generalized abdominal pain, with some constipation and vomiting and postprandial vomiting. Anxious affect. Breast mildly distended abdomen. Normal vital signs, afebrile. No focal tenderness or peritonitis by exam.    Her laboratories are reassuring. Normal abd CT. Moderate stool burden. D/C home, follow up GI. Likely IBS with constipation. Records reviewed: I reviewed her ED notes from earlier this week 11/4/2022. At that time her recent HIDA scan was reviewed as well as her CT scans from the past, showing no significant acute findings. Reassuring laboratories at that time. Right upper quadrant ultrasound was obtained and was reassuring. I also reviewed her ED notes from 10/29 and 10/16, with similar unremarkable work-ups. No CT imaging in the past month. I reviewed her HIDA scan results from 10/28, which was normal.    Toro Salinas MD  3:13 AM  11/8/2022       Social History     Tobacco Use    Smoking status: Never    Smokeless tobacco: Never   Vaping Use    Vaping Use: Never used   Substance Use Topics    Alcohol use: Never    Drug use: Never       Medications Administered during ED course:  Medications   morphine injection 4 mg (4 mg IntraVENous Given 11/8/22 0339)   aluminum-magnesium hydroxide (MAALOX) oral suspension 30 mL (30 mL Oral Given 11/8/22 0339)   ondansetron (ZOFRAN) injection 4 mg (4 mg IntraVENous Given 11/8/22 0339)   iopamidoL (ISOVUE-370) 76 % injection 100 mL (100 mL IntraVENous Given 11/8/22 0419)          Prescriptions from today's ED visit:  Current Discharge Medication List         Diagnosis and Disposition     Disposition:  Discharged    Clinical Impression:   1. Irritable bowel syndrome with constipation        Attestation:  I personally performed the services described in this documentation on this date 11/8/2022 for patient Sheryl Rincon.     Golden Zeng Sven Andersen MD        I was the first provider for this patient on this visit. To the best of my ability I reviewed relevant prior medical records, electrocardiograms, laboratories, and radiologic studies. The patient's presenting problems were discussed, and the patient was in agreement with the care plan formulated and outlined with them. Jj Nicole MD    Please note that this dictation was completed with Dragon voice recognition software. Quite often unanticipated grammatical, syntax, homophones, and other interpretive errors are inadvertently transcribed by the computer software. Please disregard these errors and excuse any errors that have escaped final proofreading.

## 2022-11-08 NOTE — PROGRESS NOTES
Call placed to GI provider NP [facility] @ [facility] Vikas. Explained to staff nurse the need for pt to obtain an appt ASAP due to multiple ED including another one today. Nurse reported work-up has been negative so far. Nurse was work pt in on 11/16/22 @ 1:45, but will talk with provider for an earlier appt.

## 2022-11-10 NOTE — PROGRESS NOTES
[facility] Outreach     Call placed to pt, Verified  and address for HIPAA security. Purpose of TC    Pt was seen at [facility] ER 22  Diagnosis: Irritable bowel syndrome with constipation    Pt was seen at [facility] ER 22  Diagnosis: RUQ pain     Pt was seen at THE [facility] ER 10/29/22     Diagnosis Comment   RUQ pain     Hypoglycemia        Pt was seen at [facility] ER 10/16/22     Diagnosis Comment   Abdominal pain, epigastric     Constipation, unspecified constipation type           TC details     Pt returned back to the ER 22 for the same ongoing s/s. Pt reported ongoing issues with back, right shoulder, chest, bilateral hip pain in addition to RUQ pain. Pt denies pain at this time. Pt reported pain typically returns when she eats. Pt also reported her stools had been, \"yellow,\" for a couple of days, but yesterday BM was light brown. Denies blood or temp. Pt did report she believes diet may had been a contributing factor for her stools being yellow. Pt reported she had consumed corn prior to pain returning and going back to the ER. Pt reported she has been primarily consuming a liquid diet. CM educated pt on diet and what foods to avoid, i.e. corn, nuts, seeds etc to rule out contributing factor. Encouraged to keep a food log including s/s triggers. Meds - pt reported she will pick remaining missing meds up today. Pt was prescribed Carafate, gaviscon and hyoscyamine SL in the ER 22, but pt reported she only received the Carafate from [facility]. Pt will call shortly to confirm receiving script for other meds. Per [facility] scripts were received. Pt also reported she ran out of Cymbalta last week after attempting to refill at [facility]. Informed pt she will need to fill with harness. Advised to start meds ASAP. PCP - 11/15/22     GI - 22 at 1:45 PM with NP     Plan of action  Provide support to patient.   CM will follow-up in 1 week     Goals         Care Coordination related to abdominal pain (pt-stated) Establish PCP relationships and regularly scheduled appointments. PCP - 10/24/22  GI - 10/19/22    10/24/22 Call placed to patient, no answer. 10/31/22  Pt was seen at THE [facility] ER 10/29/22  Diagnosis Comment   RUQ pain    Hypoglycemia    GI - attended appt 10/19/22 follow-up TBD  PCP - TBD     10/7/22  Pt was seen at [facility] ER 11/4/22  Diagnosis: RUQ pain  PCP - 11/15/22  GI - Jan, but CM will call for an appt ASAP    11/8/22  Pt was seen at [facility] ER 11/8/22  Diagnosis: Irritable bowel syndrome with constipation  PCP - 11/15/22  GI  11/16/22         Knowledge and adherence to medication plan. Taking prescribed meds        Reduce ED Utilization       Supportive resources in place to maintain patient in the community (ie., home health, equipment, DME, refer to, etc.)       [facility] - # [phone] 24/7  Nurse Access line 24/7  If you have COVID-19 symptoms, have tested positive for COVID-19 or have been exposed to someone with COVID-19, call the Nurse Access Line at [phone] and select option 8. Be Well  [address] [place] [address] Urgent [facility] [phone]  HR Service Now - [facility] Workday  IT - [phone]  [facility] - 1- [phone]  Leave of absence from work (other than jury duty and bereavement), call [phone] option 1 or call the dedicated line at [facility] ([phone]). [employer] agents are available weekdays 8:30 a.m. - 10:30 p.m. ET. You also may: [place] for website access at [facility]. Download the [employer] mobile cathryn on or after Jan. 1 for on-the-go access to your team reports. Submit information by fax to [phone]. [employer] - [phone] Go to [employer] on the Internet or your mobile device and enter the password [email or web address] to access resources, educational information, and self-service options.

## 2022-11-14 ENCOUNTER — NURSE TRIAGE (OUTPATIENT)
Dept: OTHER | Facility: CLINIC | Age: 22
End: 2022-11-14

## 2022-11-14 NOTE — TELEPHONE ENCOUNTER
Location of patient: [address] [facility]  call from [place] at [facility] with [employer]. Subjective: Caller states Vomiting, back pain and stomach pain    Current Symptoms: Pt has been seen at the ED October 29, November 4 and  7, regarding stomach pain in upper right quadrant, stomach distention and back pain. CT scan was normal, stomach distended has worsened over time. Denies current symptoms at this time. Onset: a few months ago;     Associated Symptoms: NA    Pain Severity: 8/10 at it's worst, no pain currently    Temperature: Denies fever    What has been tried: Several medications from the ED (unknown), miralax, drinking water, liquid diet     LMP: NA Pregnant: No    Recommended disposition: See in Office Today or Tomorrow    Care advice provided, patient verbalizes understanding; denies any other questions or concerns; instructed to call back for any new or worsening symptoms. Patient/Caller agrees with recommended disposition; writer provided warm transfer to [facility] at [facility] for appointment scheduling    Attention Provider: Thank you for allowing me to participate in the care of your patient. The patient was connected to triage in response to information provided to the [facility]. Please do not respond through this encounter as the response is not directed to a shared pool. Reason for Disposition   MILD pain (e.g., does not interfere with normal activities) and pain comes and goes (cramps) lasts > 48 hours  (Exception:  This same abdominal pain is a chronic symptom recurrent or ongoing AND present > 4 weeks.)    Protocols used: Abdominal Pain - Female-ADULT-[place]

## 2022-11-15 ENCOUNTER — OFFICE VISIT (OUTPATIENT)
Dept: INTERNAL MEDICINE CLINIC | Age: 22
End: 2022-11-15
Payer: COMMERCIAL

## 2022-11-15 VITALS
DIASTOLIC BLOOD PRESSURE: 77 MMHG | WEIGHT: 140 LBS | BODY MASS INDEX: 21.97 KG/M2 | TEMPERATURE: 98 F | HEART RATE: 80 BPM | OXYGEN SATURATION: 98 % | SYSTOLIC BLOOD PRESSURE: 123 MMHG | HEIGHT: 67 IN | RESPIRATION RATE: 20 BRPM

## 2022-11-15 DIAGNOSIS — R10.10 PAIN OF UPPER ABDOMEN: Primary | ICD-10-CM

## 2022-11-15 PROCEDURE — 99212 OFFICE O/P EST SF 10 MIN: CPT | Performed by: NURSE PRACTITIONER

## 2022-11-15 NOTE — PATIENT INSTRUCTIONS
Please follow up with your Gastroenterologist as discussed tomorrow. Return for any new or worsening symptoms. Otherwise, follow up in 6 months.     Regards,  Hammad Louie DALE

## 2022-11-15 NOTE — PROGRESS NOTES
Chief Complaint   Patient presents with    Abdominal Pain     Has a knot in her abdomen just above her belly button - has an appt with GI [facility] tomorrow - has not picked any of their meds up from the pharmacy yet

## 2022-11-15 NOTE — PROGRESS NOTES
Serene Booker (: 2000) is a 23 y.o. female is here for evaluation of the following chief complaint(s): Abdominal Pain (Has a knot in her abdomen just above her belly button - has an appt with GI FU tomorrow - has not picked any of their meds up from the pharmacy yet)        Subjective/Objective:   Jose iqbal was seen today for Abdominal Pain (Has a knot in her abdomen just above her belly button - has an appt with GI FU tomorrow - has not picked any of their meds up from the pharmacy yet)  Pt seen in the ER again for abdominal pain on 2022. CT redone and HIDA scan and US upper abd already done and unremarkable. PT reports vomiting 6 days ago and 3 days ago food. Pt denies any constipation or diarrhea. Reports normal light brown stool. Pt denies any vaginal discharge or urinary complaints. Pt has not taken any of the medications prescribed by the ER due to some meds being filled at [facility] and others at [facility]. Pt denies needing a refill of the zofran. Pt has appt with GI tomorrow and will likely discuss having an EDG done. PT denies sob chest pain dizziness HA palpitations. Review of Systems   Constitutional: Negative. HENT: Negative. Eyes: Negative. Respiratory: Negative. Cardiovascular: Negative. Gastrointestinal:  Positive for abdominal pain, nausea and vomiting. Negative for blood in stool, constipation, diarrhea and melena. Genitourinary: Negative. Musculoskeletal: Negative. Skin: Negative. Neurological: Negative. Endo/Heme/Allergies: Negative. Psychiatric/Behavioral: Negative. Physical Exam  Constitutional:       General: She is not in acute distress. Appearance: Normal appearance. She is normal weight. She is not ill-appearing. HENT:      Head: Normocephalic.       Right Ear: External ear normal.      Left Ear: External ear normal.      Nose: Nose normal.      Mouth/Throat:      Mouth: Mucous membranes are moist.   Cardiovascular: Rate and Rhythm: Normal rate and regular rhythm. Pulses: Normal pulses. Heart sounds: Normal heart sounds. Pulmonary:      Effort: Pulmonary effort is normal.      Breath sounds: Normal breath sounds. Abdominal:      General: Abdomen is flat. Bowel sounds are normal. There is no distension. Palpations: Abdomen is soft. There is no mass. Tenderness: There is abdominal tenderness. There is no right CVA tenderness, left CVA tenderness, guarding or rebound. Hernia: No hernia is present. Musculoskeletal:         General: Normal range of motion. Cervical back: Normal range of motion and neck supple. Skin:     General: Skin is warm and dry. Capillary Refill: Capillary refill takes less than 2 seconds. Neurological:      General: No focal deficit present. Mental Status: She is alert and oriented to person, place, and time. Psychiatric:         Mood and Affect: Mood normal.         Behavior: Behavior normal.         Thought Content:  Thought content normal.         Judgment: Judgment normal.        /77 (BP 1 Location: Right arm, BP Patient Position: Sitting, BP Cuff Size: Adult)   Pulse 80   Temp 98 °F (36.7 °C) (Temporal)   Resp 20   Ht 5' 7\" (1.702 m)   Wt 140 lb (63.5 kg)   LMP  (LMP Unknown)   SpO2 98%   BMI 21.93 kg/m²      Admission on 11/08/2022, Discharged on 11/08/2022   Component Date Value Ref Range Status    WBC 11/08/2022 5.6  3.6 - 11.0 K/uL Final    RBC 11/08/2022 4.06  3.80 - 5.20 M/uL Final    HGB 11/08/2022 11.8  11.5 - 16.0 g/dL Final    HCT 11/08/2022 34.4 (A)  35.0 - 47.0 % Final    MCV 11/08/2022 84.7  80.0 - 99.0 FL Final    MCH 11/08/2022 29.1  26.0 - 34.0 PG Final    MCHC 11/08/2022 34.3  30.0 - 36.5 g/dL Final    RDW 11/08/2022 12.1  11.5 - 14.5 % Final    PLATELET 59/66/7406 588  150 - 400 K/uL Final    MPV 11/08/2022 10.3  8.9 - 12.9 FL Final    NRBC 11/08/2022 0.0  0  WBC Final    ABSOLUTE NRBC 11/08/2022 0.00  0.00 - 0.01 K/uL Final    NEUTROPHILS 11/08/2022 41  32 - 75 % Final    LYMPHOCYTES 11/08/2022 51 (A)  12 - 49 % Final    MONOCYTES 11/08/2022 7  5 - 13 % Final    EOSINOPHILS 11/08/2022 1  0 - 7 % Final    BASOPHILS 11/08/2022 0  0 - 1 % Final    IMMATURE GRANULOCYTES 11/08/2022 0  0.0 - 0.5 % Final    ABS. NEUTROPHILS 11/08/2022 2.3  1.8 - 8.0 K/UL Final    ABS. LYMPHOCYTES 11/08/2022 2.8  0.8 - 3.5 K/UL Final    ABS. MONOCYTES 11/08/2022 0.4  0.0 - 1.0 K/UL Final    ABS. EOSINOPHILS 11/08/2022 0.1  0.0 - 0.4 K/UL Final    ABS. BASOPHILS 11/08/2022 0.0  0.0 - 0.1 K/UL Final    ABS. IMM. GRANS. 11/08/2022 0.0  0.00 - 0.04 K/UL Final    DF 11/08/2022 AUTOMATED    Final    Sodium 11/08/2022 139  136 - 145 mmol/L Final    Potassium 11/08/2022 3.9  3.5 - 5.1 mmol/L Final    Chloride 11/08/2022 110 (A)  97 - 108 mmol/L Final    CO2 11/08/2022 25  21 - 32 mmol/L Final    Anion gap 11/08/2022 4 (A)  5 - 15 mmol/L Final    Glucose 11/08/2022 84  65 - 100 mg/dL Final    BUN 11/08/2022 6  6 - 20 MG/DL Final    Creatinine 11/08/2022 0.75  0.55 - 1.02 MG/DL Final    BUN/Creatinine ratio 11/08/2022 8 (A)  12 - 20   Final    eGFR 11/08/2022 >60  >60 ml/min/1.73m2 Final    Comment:      Pediatric calculator link: [email or web address]       Effective Oct 3, 2022       These results are not intended for use in patients <25years of age. eGFR results are calculated without a race factor using  the 2021 CKD-EPI equation. Careful clinical correlation is recommended, particularly when comparing to results calculated using previous equations. The CKD-EPI equation is less accurate in patients with extremes of muscle mass, extra-renal metabolism of creatinine, excessive creatine ingestion, or following therapy that affects renal tubular secretion.       Calcium 11/08/2022 9.0  8.5 - 10.1 MG/DL Final    Bilirubin, total 11/08/2022 0.7  0.2 - 1.0 MG/DL Final    ALT (SGPT) 11/08/2022 20  12 - 78 U/L Final AST (SGOT) 11/08/2022 18  15 - 37 U/L Final    Alk.  phosphatase 11/08/2022 50  45 - 117 U/L Final    Protein, total 11/08/2022 7.3  6.4 - 8.2 g/dL Final    Albumin 11/08/2022 4.0  3.5 - 5.0 g/dL Final    Globulin 11/08/2022 3.3  2.0 - 4.0 g/dL Final    A-G Ratio 11/08/2022 1.2  1.1 - 2.2   Final    Color 11/08/2022 YELLOW/STRAW    Final    Color Reference Range: Straw, Yellow or Dark Yellow    Appearance 11/08/2022 CLEAR  CLEAR   Final    Specific gravity 11/08/2022 1.020  1.003 - 1.030   Final    pH (UA) 11/08/2022 6.0  5.0 - 8.0   Final    Protein 11/08/2022 Negative  NEG mg/dL Final    Glucose 11/08/2022 Negative  NEG mg/dL Final    Ketone 11/08/2022 Negative  NEG mg/dL Final    Bilirubin 11/08/2022 Negative  NEG   Final    Blood 11/08/2022 Negative  NEG   Final    Urobilinogen 11/08/2022 0.2  0.2 - 1.0 EU/dL Final    Nitrites 11/08/2022 Negative  NEG   Final    Leukocyte Esterase 11/08/2022 Negative  NEG   Final    Lipase 11/08/2022 79  73 - 393 U/L Final    Pregnancy test,urine (POC) 11/08/2022 Negative  NEG   Final   Admission on 11/04/2022, Discharged on 11/04/2022   Component Date Value Ref Range Status    WBC 11/04/2022 5.6  3.6 - 11.0 K/uL Final    RBC 11/04/2022 4.55  3.80 - 5.20 M/uL Final    HGB 11/04/2022 12.9  11.5 - 16.0 g/dL Final    HCT 11/04/2022 38.1  35.0 - 47.0 % Final    MCV 11/04/2022 83.7  80.0 - 99.0 FL Final    MCH 11/04/2022 28.4  26.0 - 34.0 PG Final    MCHC 11/04/2022 33.9  30.0 - 36.5 g/dL Final    RDW 11/04/2022 12.1  11.5 - 14.5 % Final    PLATELET 63/62/9791 660  150 - 400 K/uL Final    MPV 11/04/2022 10.2  8.9 - 12.9 FL Final    NRBC 11/04/2022 0.0  0  WBC Final    ABSOLUTE NRBC 11/04/2022 0.00  0.00 - 0.01 K/uL Final    NEUTROPHILS 11/04/2022 55  32 - 75 % Final    LYMPHOCYTES 11/04/2022 36  12 - 49 % Final    MONOCYTES 11/04/2022 8  5 - 13 % Final    EOSINOPHILS 11/04/2022 1  0 - 7 % Final    BASOPHILS 11/04/2022 0  0 - 1 % Final    IMMATURE GRANULOCYTES 11/04/2022 0  0.0 - 0.5 % Final    ABS. NEUTROPHILS 11/04/2022 3.1  1.8 - 8.0 K/UL Final    ABS. LYMPHOCYTES 11/04/2022 2.0  0.8 - 3.5 K/UL Final    ABS. MONOCYTES 11/04/2022 0.4  0.0 - 1.0 K/UL Final    ABS. EOSINOPHILS 11/04/2022 0.1  0.0 - 0.4 K/UL Final    ABS. BASOPHILS 11/04/2022 0.0  0.0 - 0.1 K/UL Final    ABS. IMM. GRANS. 11/04/2022 0.0  0.00 - 0.04 K/UL Final    DF 11/04/2022 AUTOMATED    Final    Sodium 11/04/2022 140  136 - 145 mmol/L Final    Potassium 11/04/2022 3.6  3.5 - 5.1 mmol/L Final    Chloride 11/04/2022 110 (A)  97 - 108 mmol/L Final    CO2 11/04/2022 28  21 - 32 mmol/L Final    Anion gap 11/04/2022 2 (A)  5 - 15 mmol/L Final    Glucose 11/04/2022 77  65 - 100 mg/dL Final    BUN 11/04/2022 8  6 - 20 MG/DL Final    Creatinine 11/04/2022 0.77  0.55 - 1.02 MG/DL Final    BUN/Creatinine ratio 11/04/2022 10 (A)  12 - 20   Final    eGFR 11/04/2022 >60  >60 ml/min/1.73m2 Final    Comment:      Pediatric calculator link: [email or web address]       Effective Oct 3, 2022       These results are not intended for use in patients <25years of age. eGFR results are calculated without a race factor using  the 2021 CKD-EPI equation. Careful clinical correlation is recommended, particularly when comparing to results calculated using previous equations. The CKD-EPI equation is less accurate in patients with extremes of muscle mass, extra-renal metabolism of creatinine, excessive creatine ingestion, or following therapy that affects renal tubular secretion. Calcium 11/04/2022 8.9  8.5 - 10.1 MG/DL Final    Bilirubin, total 11/04/2022 1.2 (A)  0.2 - 1.0 MG/DL Final    ALT (SGPT) 11/04/2022 19  12 - 78 U/L Final    AST (SGOT) 11/04/2022 14 (A)  15 - 37 U/L Final    Alk.  phosphatase 11/04/2022 54  45 - 117 U/L Final    Protein, total 11/04/2022 7.8  6.4 - 8.2 g/dL Final    Albumin 11/04/2022 4.2  3.5 - 5.0 g/dL Final    Globulin 11/04/2022 3.6  2.0 - 4.0 g/dL Final    A-G Ratio 11/04/2022 1.2  1.1 - 2.2   Final    Lipase 11/04/2022 80  73 - 393 U/L Final    Color 11/04/2022 YELLOW/STRAW    Final    Color Reference Range: Straw, Yellow or Dark Yellow    Appearance 11/04/2022 CLEAR  CLEAR   Final    Specific gravity 11/04/2022 1.013    Final    pH (UA) 11/04/2022 7.0  5.0 - 8.0   Final    Protein 11/04/2022 Negative  NEG mg/dL Final    Glucose 11/04/2022 Negative  NEG mg/dL Final    Ketone 11/04/2022 Negative  NEG mg/dL Final    Bilirubin 11/04/2022 Negative  NEG   Final    Blood 11/04/2022 Negative  NEG   Final    Urobilinogen 11/04/2022 1.0  0.2 - 1.0 EU/dL Final    Nitrites 11/04/2022 Negative  NEG   Final    Leukocyte Esterase 11/04/2022 TRACE (A)  NEG   Final    WBC 11/04/2022 0-4  0 - 4 /hpf Final    RBC 11/04/2022 0-5  0 - 5 /hpf Final    Epithelial cells 11/04/2022 FEW  FEW /lpf Final    Epithelial cell category consists of squamous cells and /or transitional urothelial cells. Renal tubular cells, if present, are separately identified as such.     Bacteria 11/04/2022 Negative  NEG /hpf Final    UA:UC IF INDICATED 11/04/2022 CULTURE NOT INDICATED BY UA RESULT  CNI   Final   Admission on 10/29/2022, Discharged on 10/29/2022   Component Date Value Ref Range Status    WBC 10/29/2022 6.3  4.6 - 13.2 K/uL Final    RBC 10/29/2022 4.66  4.20 - 5.30 M/uL Final    HGB 10/29/2022 13.5  12.0 - 16.0 g/dL Final    HCT 10/29/2022 39.1  35.0 - 45.0 % Final    MCV 10/29/2022 83.9  78.0 - 100.0 FL Final    MCH 10/29/2022 29.0  24.0 - 34.0 PG Final    MCHC 10/29/2022 34.5  31.0 - 37.0 g/dL Final    RDW 10/29/2022 12.4  11.6 - 14.5 % Final    PLATELET 12/72/6147 770  135 - 420 K/uL Final    MPV 10/29/2022 10.2  9.2 - 11.8 FL Final    NRBC 10/29/2022 0.0  0  WBC Final    ABSOLUTE NRBC 10/29/2022 0.00  0.00 - 0.01 K/uL Final    NEUTROPHILS 10/29/2022 59  40 - 73 % Final    LYMPHOCYTES 10/29/2022 32  21 - 52 % Final    MONOCYTES 10/29/2022 8  3 - 10 % Final    EOSINOPHILS 10/29/2022 1  0 - 5 % Final BASOPHILS 10/29/2022 1  0 - 2 % Final    IMMATURE GRANULOCYTES 10/29/2022 0  0.0 - 0.5 % Final    ABS. NEUTROPHILS 10/29/2022 3.7  1.8 - 8.0 K/UL Final    ABS. LYMPHOCYTES 10/29/2022 2.0  0.9 - 3.6 K/UL Final    ABS. MONOCYTES 10/29/2022 0.5  0.05 - 1.2 K/UL Final    ABS. EOSINOPHILS 10/29/2022 0.0  0.0 - 0.4 K/UL Final    ABS. BASOPHILS 10/29/2022 0.0  0.0 - 0.1 K/UL Final    ABS. IMM. GRANS. 10/29/2022 0.0  0.00 - 0.04 K/UL Final    DF 10/29/2022 AUTOMATED    Final    Sodium 10/29/2022 139  136 - 145 mmol/L Final    Potassium 10/29/2022 3.7  3.5 - 5.5 mmol/L Final    Chloride 10/29/2022 105  100 - 111 mmol/L Final    CO2 10/29/2022 28  21 - 32 mmol/L Final    Anion gap 10/29/2022 6  3.0 - 18 mmol/L Final    Glucose 10/29/2022 69 (A)  74 - 99 mg/dL Final    BUN 10/29/2022 14  7.0 - 18 MG/DL Final    Creatinine 10/29/2022 0.82  0.6 - 1.3 MG/DL Final    BUN/Creatinine ratio 10/29/2022 17  12 - 20   Final    eGFR 10/29/2022 >60  >60 ml/min/1.73m2 Final    Comment:      Pediatric calculator link: [email or web address]       Effective Oct 3, 2022       These results are not intended for use in patients <25years of age. eGFR results are calculated without a race factor using  the 2021 CKD-EPI equation. Careful clinical correlation is recommended, particularly when comparing to results calculated using previous equations. The CKD-EPI equation is less accurate in patients with extremes of muscle mass, extra-renal metabolism of creatinine, excessive creatine ingestion, or following therapy that affects renal tubular secretion.       Calcium 10/29/2022 9.1  8.5 - 10.1 MG/DL Final    Lipase 10/29/2022 108  73 - 393 U/L Final    Color 10/29/2022 YELLOW    Final    Appearance 10/29/2022 CLEAR    Final    Specific gravity 10/29/2022 1.025  1.005 - 1.030   Final    pH (UA) 10/29/2022 6.5  5.0 - 8.0   Final    Protein 10/29/2022 Negative  NEG mg/dL Final    Glucose 10/29/2022 Negative  NEG mg/dL Final    Ketone 10/29/2022 TRACE (A)  NEG mg/dL Final    Bilirubin 10/29/2022 Negative  NEG   Final    Blood 10/29/2022 Negative  NEG   Final    Urobilinogen 10/29/2022 1.0  0.2 - 1.0 EU/dL Final    Nitrites 10/29/2022 Negative  NEG   Final    Leukocyte Esterase 10/29/2022 SMALL (A)  NEG   Final    HCG urine, QL 10/29/2022 Negative  NEG   Final    Test results should be confirmed using serum quantitative hCG when detection of pregnancy is critical and before performing any critical medical procedure. WBC 10/29/2022 4 to 10  0 - 5 /hpf Final    RBC 10/29/2022 0 to 3  0 - 5 /hpf Final    Epithelial cells 10/29/2022 1+  0 - 5 /lpf Final    Bacteria 10/29/2022 1+ (A)  NEG /hpf Final    Mucus 10/29/2022 FEW (A)  NEG /lpf Final    Protein, total 10/29/2022 8.5 (A)  6.4 - 8.2 g/dL Final    Albumin 10/29/2022 4.5  3.4 - 5.0 g/dL Final    Globulin 10/29/2022 4.0  2.0 - 4.0 g/dL Final    A-G Ratio 10/29/2022 1.1  0.8 - 1.7   Final    Bilirubin, total 10/29/2022 0.8  0.2 - 1.0 MG/DL Final    Bilirubin, direct 10/29/2022 0.2  0.0 - 0.2 MG/DL Final    Alk.  phosphatase 10/29/2022 60  45 - 117 U/L Final    AST (SGOT) 10/29/2022 15  10 - 38 U/L Final    ALT (SGPT) 10/29/2022 19  13 - 56 U/L Final   Admission on 10/16/2022, Discharged on 10/16/2022   Component Date Value Ref Range Status    WBC 10/16/2022 7.7  3.6 - 11.0 K/uL Final    RBC 10/16/2022 4.50  3.80 - 5.20 M/uL Final    HGB 10/16/2022 13.1  11.5 - 16.0 g/dL Final    HCT 10/16/2022 37.2  35.0 - 47.0 % Final    MCV 10/16/2022 82.7  80.0 - 99.0 FL Final    MCH 10/16/2022 29.1  26.0 - 34.0 PG Final    MCHC 10/16/2022 35.2  30.0 - 36.5 g/dL Final    RDW 10/16/2022 12.4  11.5 - 14.5 % Final    PLATELET 09/50/9007 156  150 - 400 K/uL Final    MPV 10/16/2022 10.2  8.9 - 12.9 FL Final    NRBC 10/16/2022 0.0  0  WBC Final    ABSOLUTE NRBC 10/16/2022 0.00  0.00 - 0.01 K/uL Final    NEUTROPHILS 10/16/2022 52  32 - 75 % Final    LYMPHOCYTES 10/16/2022 38 12 - 49 % Final    MONOCYTES 10/16/2022 8  5 - 13 % Final    EOSINOPHILS 10/16/2022 1  0 - 7 % Final    BASOPHILS 10/16/2022 1  0 - 1 % Final    IMMATURE GRANULOCYTES 10/16/2022 0  0.0 - 0.5 % Final    ABS. NEUTROPHILS 10/16/2022 4.0  1.8 - 8.0 K/UL Final    ABS. LYMPHOCYTES 10/16/2022 2.9  0.8 - 3.5 K/UL Final    ABS. MONOCYTES 10/16/2022 0.6  0.0 - 1.0 K/UL Final    ABS. EOSINOPHILS 10/16/2022 0.1  0.0 - 0.4 K/UL Final    ABS. BASOPHILS 10/16/2022 0.0  0.0 - 0.1 K/UL Final    ABS. IMM. GRANS. 10/16/2022 0.0  0.00 - 0.04 K/UL Final    DF 10/16/2022 AUTOMATED    Final    Sodium 10/16/2022 141  136 - 145 mmol/L Final    Potassium 10/16/2022 3.5  3.5 - 5.1 mmol/L Final    Chloride 10/16/2022 108  97 - 108 mmol/L Final    CO2 10/16/2022 26  21 - 32 mmol/L Final    Anion gap 10/16/2022 7  5 - 15 mmol/L Final    Glucose 10/16/2022 91  65 - 100 mg/dL Final    BUN 10/16/2022 9  6 - 20 MG/DL Final    Creatinine 10/16/2022 0.92  0.55 - 1.02 MG/DL Final    BUN/Creatinine ratio 10/16/2022 10 (A)  12 - 20   Final    eGFR 10/16/2022 >60  >60 ml/min/1.73m2 Final    Comment:      Pediatric calculator link: [email or web address]       Effective Oct 3, 2022       These results are not intended for use in patients <25years of age. eGFR results are calculated without a race factor using  the 2021 CKD-EPI equation. Careful clinical correlation is recommended, particularly when comparing to results calculated using previous equations. The CKD-EPI equation is less accurate in patients with extremes of muscle mass, extra-renal metabolism of creatinine, excessive creatine ingestion, or following therapy that affects renal tubular secretion.       Calcium 10/16/2022 9.2  8.5 - 10.1 MG/DL Final    Color 10/16/2022 YELLOW/STRAW    Final    Color Reference Range: Straw, Yellow or Dark Yellow    Appearance 10/16/2022 CLEAR  CLEAR   Final    Specific gravity 10/16/2022 1.018    Final    pH (UA) 10/16/2022 7.0  5.0 - 8.0   Final    Protein 10/16/2022 Negative  NEG mg/dL Final    Glucose 10/16/2022 Negative  NEG mg/dL Final    Ketone 10/16/2022 Negative  NEG mg/dL Final    Bilirubin 10/16/2022 Negative  NEG   Final    Blood 10/16/2022 Negative  NEG   Final    Urobilinogen 10/16/2022 1.0  0.2 - 1.0 EU/dL Final    Nitrites 10/16/2022 Negative  NEG   Final    Leukocyte Esterase 10/16/2022 SMALL (A)  NEG   Final    UA:UC IF INDICATED 10/16/2022 URINE CULTURE ORDERED (A)  CNI   Final    WBC 10/16/2022 10-20  0 - 4 /hpf Final    RBC 10/16/2022 0-5  0 - 5 /hpf Final    Epithelial cells 10/16/2022 MODERATE (A)  FEW /lpf Final    Epithelial cell category consists of squamous cells and /or transitional urothelial cells. Renal tubular cells, if present, are separately identified as such.     Bacteria 10/16/2022 Negative  NEG /hpf Final    Hyaline cast 10/16/2022 0-2  0 - 2 /lpf Final    Pregnancy test,urine (POC) 10/16/2022 Negative  NEG   Final    Special Requests: 10/16/2022     Final                    Value:NO SPECIAL REQUESTS  Reflexed from [id number]      [place] Count 10/16/2022     Final                    Value:[id number]  COLONIES/mL      Culture result: 10/16/2022 MIXED UROGENITAL [place] ISOLATED    Final   Office Visit on 08/22/2022   Component Date Value Ref Range Status    Specific Gravity 08/29/2022 1.024  1.005 - 1.030 Final    pH (UA) 08/29/2022 6.5  5.0 - 7.5 Final    Color 08/29/2022 Yellow  Yellow Final    Appearance 08/29/2022 Clear  Clear Final    Leukocyte Esterase 08/29/2022 2+ (A)  Negative Final    Protein 08/29/2022 Trace  Negative/Trace Final    Glucose 08/29/2022 Negative  Negative Final    Ketone 08/29/2022 Negative  Negative Final    Blood 08/29/2022 Negative  Negative Final    Bilirubin 08/29/2022 Negative  Negative Final    Urobilinogen 08/29/2022 1.0  0.2 - 1.0 mg/dL Final    Nitrites 08/29/2022 Negative  Negative Final    Microscopic Examination 08/29/2022 See additional order   Final Microscopic was indicated and was performed. WBC 08/29/2022 11-30 (A)  0 - 5 /hpf Final    RBC 08/29/2022 None seen  0 - 2 /hpf Final    Epithelial cells 08/29/2022 0-10  0 - 10 /hpf Final    Casts 08/29/2022 None seen  None seen /lpf Final    Bacteria 08/29/2022 Few  None seen/Few Final    HCV Ab 08/29/2022 <0.1  0.0 - 0.9 s/co ratio Final    HCV Interpretation 08/29/2022 Comment   Final    Comment: Negative  Not infected with HCV, unless recent infection is suspected or other  evidence exists to indicate HCV infection.       TSH 08/29/2022 1.640  0.450 - 4.500 uIU/mL Final   Admission on 06/29/2022, Discharged on 06/29/2022   Component Date Value Ref Range Status    Ventricular Rate 06/28/2022 87  BPM Final    Atrial Rate 06/28/2022 87  BPM Final    P-R Interval 06/28/2022 130  ms Final    QRS Duration 06/28/2022 78  ms Final    Q-T Interval 06/28/2022 360  ms Final    QTC Calculation (Bezet) 06/28/2022 433  ms Final    Calculated P Axis 06/28/2022 63  degrees Final    Calculated R Axis 06/28/2022 81  degrees Final    Calculated T Axis 06/28/2022 46  degrees Final    Diagnosis 06/28/2022    Final                    Value:** Poor data quality, interpretation may be adversely affected  Normal sinus rhythm with sinus arrhythmia  Normal ECG     Confirmed by Darryn Martinez M.D. ([id number]) on 6/29/2022 12:06:36 PM      WBC 06/28/2022 7.8  3.6 - 11.0 K/uL Final    RBC 06/28/2022 4.35  3.80 - 5.20 M/uL Final    HGB 06/28/2022 12.6  11.5 - 16.0 g/dL Final    HCT 06/28/2022 37.1  35.0 - 47.0 % Final    MCV 06/28/2022 85.3  80.0 - 99.0 FL Final    MCH 06/28/2022 29.0  26.0 - 34.0 PG Final    MCHC 06/28/2022 34.0  30.0 - 36.5 g/dL Final    RDW 06/28/2022 12.5  11.5 - 14.5 % Final    PLATELET 15/50/3782 311  150 - 400 K/uL Final    MPV 06/28/2022 10.6  8.9 - 12.9 FL Final    NRBC 06/28/2022 0.0  0  WBC Final    ABSOLUTE NRBC 06/28/2022 0.00  0.00 - 0.01 K/uL Final    NEUTROPHILS 06/28/2022 67  32 - 75 % Final LYMPHOCYTES 06/28/2022 21  12 - 49 % Final    MONOCYTES 06/28/2022 10  5 - 13 % Final    EOSINOPHILS 06/28/2022 1  0 - 7 % Final    BASOPHILS 06/28/2022 0  0 - 1 % Final    IMMATURE GRANULOCYTES 06/28/2022 1 (A)  0.0 - 0.5 % Final    ABS. NEUTROPHILS 06/28/2022 5.3  1.8 - 8.0 K/UL Final    ABS. LYMPHOCYTES 06/28/2022 1.7  0.8 - 3.5 K/UL Final    ABS. MONOCYTES 06/28/2022 0.8  0.0 - 1.0 K/UL Final    ABS. EOSINOPHILS 06/28/2022 0.1  0.0 - 0.4 K/UL Final    ABS. BASOPHILS 06/28/2022 0.0  0.0 - 0.1 K/UL Final    ABS. IMM. GRANS. 06/28/2022 0.0  0.00 - 0.04 K/UL Final    DF 06/28/2022 AUTOMATED    Final    Sodium 06/28/2022 138  136 - 145 mmol/L Final    Potassium 06/28/2022 3.4 (A)  3.5 - 5.1 mmol/L Final    Chloride 06/28/2022 104  97 - 108 mmol/L Final    CO2 06/28/2022 27  21 - 32 mmol/L Final    Anion gap 06/28/2022 7  5 - 15 mmol/L Final    Glucose 06/28/2022 85  65 - 100 mg/dL Final    BUN 06/28/2022 7  6 - 20 MG/DL Final    Creatinine 06/28/2022 0.94  0.55 - 1.02 MG/DL Final    BUN/Creatinine ratio 06/28/2022 7 (A)  12 - 20   Final    GFR est AA 06/28/2022 >60  >60 ml/min/1.73m2 Final    GFR est non-AA 06/28/2022 >60  >60 ml/min/1.73m2 Final    Estimated GFR is calculated using the IDMS-traceable Modification of Diet in Renal Disease (MDRD) Study equation, reported for both  Americans (GFRAA) and non- Americans (GFRNA), and normalized to 1.73m2 body surface area. The physician must decide which value applies to the patient. Calcium 06/28/2022 9.3  8.5 - 10.1 MG/DL Final    Bilirubin, total 06/28/2022 0.9  0.2 - 1.0 MG/DL Final    ALT (SGPT) 06/28/2022 12  12 - 78 U/L Final    AST (SGOT) 06/28/2022 12 (A)  15 - 37 U/L Final    Alk.  phosphatase 06/28/2022 57  45 - 117 U/L Final    Protein, total 06/28/2022 8.4 (A)  6.4 - 8.2 g/dL Final    Albumin 06/28/2022 4.6  3.5 - 5.0 g/dL Final    Globulin 06/28/2022 3.8  2.0 - 4.0 g/dL Final    A-G Ratio 06/28/2022 1.2  1.1 - 2.2   Final    Troponin-High Sensitivity 06/28/2022 <4  0 - 51 ng/L Final    Comment: A HS troponin value change of (+ or -) 50% or more below the 99th percentile, in a 1/2/3 hr interval represents a significant change. Clinical correcation is recommended. A HS troponin value change of (+ or -) 20% or above the 99th percentile, in a 1/2/3 hr interval represents a significant change. Clinical correlation is recommended. 99th Percentile:   Women: 0-51 ng/L                                                                Men:   0-76 ng/L      Color 06/29/2022 YELLOW/STRAW    Final    Color Reference Range: Straw, Yellow or Dark Yellow    Appearance 06/29/2022 CLEAR  CLEAR   Final    Specific gravity 06/29/2022 1.017    Final    pH (UA) 06/29/2022 5.5  5.0 - 8.0   Final    Protein 06/29/2022 Negative  NEG mg/dL Final    Glucose 06/29/2022 Negative  NEG mg/dL Final    Ketone 06/29/2022 Negative  NEG mg/dL Final    Bilirubin 06/29/2022 Negative  NEG   Final    Blood 06/29/2022 TRACE (A)  NEG   Final    Urobilinogen 06/29/2022 1.0  0.2 - 1.0 EU/dL Final    Nitrites 06/29/2022 Negative  NEG   Final    Leukocyte Esterase 06/29/2022 SMALL (A)  NEG   Final    WBC 06/29/2022 0-4  0 - 4 /hpf Final    RBC 06/29/2022 0-5  0 - 5 /hpf Final    Epithelial cells 06/29/2022 MANY (A)  FEW /lpf Final    Epithelial cell category consists of squamous cells and /or transitional urothelial cells. Renal tubular cells, if present, are separately identified as such. Bacteria 06/29/2022 1+ (A)  NEG /hpf Final    UA:UC IF INDICATED 06/29/2022 CULTURE NOT INDICATED BY UA RESULT  CNI   Final    Mucus 06/29/2022 1+ (A)  NEG /lpf Final    HCG, Ql. 06/28/2022 Negative  NEG   Final    The serum pregnancy test becomes positive at about the time of implantation of the conceptus and approximates a quantitative bHCG value of >5 mIU/ML.     Pregnancy test,urine (POC) 06/29/2022 Negative  NEG   Final         Past Surgical History:   Procedure Laterality Date    HX ORTHOPAEDIC right ACL    HX TYMPANOSTOMY          No past medical history on file. Current Outpatient Medications   Medication Instructions    aluminum hydrox-magnesium carb (Gaviscon)  mg/15 mL suspension 15 mL, Oral, EVERY 6 HOURS AS NEEDED    DULoxetine (CYMBALTA) 20 mg capsule duloxetine 20 mg capsule,delayed release<BR> TAKE 1 CAPSULE BY MOUTH ONCE DAILY    hyoscyamine SL (LEVSIN/SL) 0.125 mg, SubLINGual, EVERY 4 HOURS AS NEEDED    medroxyPROGESTERone (DEPO-PROVERA) 150 mg, IntraMUSCular, EVERY 3 MONTHS    pantoprazole (PROTONIX) 40 mg, Oral, DAILY    sucralfate (CARAFATE) 1 g, Oral, 4 TIMES DAILY        Assessment/Plan:     The above diagnosis is a chronic problem. We discussed expected course, resolution, and complications of diagnosis in detail. I advised her to call back or return to office if symptoms worsen/change/persist.        ICD-10-CM ICD-9-CM    1. Pain of upper abdomen  R10.10 789.09          Return in about 6 months (around 5/15/2023), or if symptoms worsen or fail to improve. An electronic signature was used to authenticate this note.   -- Jessica Collazo NP

## 2022-11-17 ENCOUNTER — HOSPITAL ENCOUNTER (EMERGENCY)
Age: 22
Discharge: HOME | End: 2022-11-17
Attending: PEDIATRICS
Payer: COMMERCIAL

## 2022-11-17 ENCOUNTER — NURSE TRIAGE (OUTPATIENT)
Dept: OTHER | Facility: CLINIC | Age: 22
End: 2022-11-17

## 2022-11-17 ENCOUNTER — TRANSCRIBE ORDER (OUTPATIENT)
Dept: SCHEDULING | Age: 22
End: 2022-11-17

## 2022-11-17 ENCOUNTER — PATIENT OUTREACH (OUTPATIENT)
Dept: OTHER | Age: 22
End: 2022-11-17

## 2022-11-17 VITALS
RESPIRATION RATE: 20 BRPM | OXYGEN SATURATION: 99 % | HEART RATE: 80 BPM | BODY MASS INDEX: 21.68 KG/M2 | DIASTOLIC BLOOD PRESSURE: 73 MMHG | TEMPERATURE: 99.5 F | SYSTOLIC BLOOD PRESSURE: 132 MMHG | WEIGHT: 138.45 LBS

## 2022-11-17 DIAGNOSIS — R10.13 ABDOMINAL PAIN, EPIGASTRIC: ICD-10-CM

## 2022-11-17 DIAGNOSIS — U07.1 COVID-19 VIRUS INFECTION: Primary | ICD-10-CM

## 2022-11-17 DIAGNOSIS — K59.04 CHRONIC IDIOPATHIC CONSTIPATION: ICD-10-CM

## 2022-11-17 DIAGNOSIS — R11.2 NAUSEA WITH VOMITING: ICD-10-CM

## 2022-11-17 DIAGNOSIS — K29.90 GASTRITIS AND DUODENITIS: ICD-10-CM

## 2022-11-17 DIAGNOSIS — R19.4 CHANGE IN BOWEL HABITS: Primary | ICD-10-CM

## 2022-11-17 LAB
ALBUMIN SERPL-MCNC: 4.3 G/DL (ref 3.5–5)
ALBUMIN/GLOB SERPL: 1 {RATIO} (ref 1.1–2.2)
ALP SERPL-CCNC: 56 U/L (ref 45–117)
ALT SERPL-CCNC: 17 U/L (ref 12–78)
ANION GAP SERPL CALC-SCNC: 5 MMOL/L (ref 5–15)
APPEARANCE UR: CLEAR
AST SERPL-CCNC: 10 U/L (ref 15–37)
BACTERIA URNS QL MICRO: ABNORMAL /HPF
BASOPHILS # BLD: 0 K/UL (ref 0–0.1)
BASOPHILS NFR BLD: 0 % (ref 0–1)
BILIRUB SERPL-MCNC: 0.5 MG/DL (ref 0.2–1)
BILIRUB UR QL: NEGATIVE
BLASTS NFR BLD MANUAL: 0 %
BUN SERPL-MCNC: 7 MG/DL (ref 6–20)
BUN/CREAT SERPL: 9 (ref 12–20)
CALCIUM SERPL-MCNC: 8.9 MG/DL (ref 8.5–10.1)
CHLORIDE SERPL-SCNC: 108 MMOL/L (ref 97–108)
CO2 SERPL-SCNC: 26 MMOL/L (ref 21–32)
COLOR UR: ABNORMAL
COMMENT, HOLDF: NORMAL
CREAT SERPL-MCNC: 0.81 MG/DL (ref 0.55–1.02)
DIFFERENTIAL METHOD BLD: ABNORMAL
EOSINOPHIL # BLD: 0.1 K/UL (ref 0–0.4)
EOSINOPHIL NFR BLD: 1 % (ref 0–7)
EPITH CASTS URNS QL MICRO: ABNORMAL /LPF
ERYTHROCYTE [DISTWIDTH] IN BLOOD BY AUTOMATED COUNT: 12.3 % (ref 11.5–14.5)
FLUAV RNA SPEC QL NAA+PROBE: NOT DETECTED
FLUBV RNA SPEC QL NAA+PROBE: NOT DETECTED
GLOBULIN SER CALC-MCNC: 4.1 G/DL (ref 2–4)
GLUCOSE SERPL-MCNC: 86 MG/DL (ref 65–100)
GLUCOSE UR STRIP.AUTO-MCNC: NEGATIVE MG/DL
HCG UR QL: NEGATIVE
HCT VFR BLD AUTO: 38.9 % (ref 35–47)
HGB BLD-MCNC: 13.3 G/DL (ref 11.5–16)
HGB UR QL STRIP: NEGATIVE
HYALINE CASTS URNS QL MICRO: ABNORMAL /LPF (ref 0–5)
IMM GRANULOCYTES # BLD AUTO: 0 K/UL
IMM GRANULOCYTES NFR BLD AUTO: 0 %
KETONES UR QL STRIP.AUTO: NEGATIVE MG/DL
LEUKOCYTE ESTERASE UR QL STRIP.AUTO: ABNORMAL
LIPASE SERPL-CCNC: 77 U/L (ref 73–393)
LYMPHOCYTES # BLD: 1 K/UL (ref 0.8–3.5)
LYMPHOCYTES NFR BLD: 17 % (ref 12–49)
MCH RBC QN AUTO: 29.2 PG (ref 26–34)
MCHC RBC AUTO-ENTMCNC: 34.2 G/DL (ref 30–36.5)
MCV RBC AUTO: 85.5 FL (ref 80–99)
METAMYELOCYTES NFR BLD MANUAL: 0 %
MONOCYTES # BLD: 1.2 K/UL (ref 0–1)
MONOCYTES NFR BLD: 21 % (ref 5–13)
MYELOCYTES NFR BLD MANUAL: 0 %
NEUTS BAND NFR BLD MANUAL: 0 % (ref 0–6)
NEUTS SEG # BLD: 3.5 K/UL (ref 1.8–8)
NEUTS SEG NFR BLD: 61 % (ref 32–75)
NITRITE UR QL STRIP.AUTO: NEGATIVE
NRBC # BLD: 0 K/UL (ref 0–0.01)
NRBC BLD-RTO: 0 PER 100 WBC
OTHER CELLS NFR BLD MANUAL: 0 %
PH UR STRIP: 7 [PH] (ref 5–8)
PLATELET # BLD AUTO: 234 K/UL (ref 150–400)
PMV BLD AUTO: 10.4 FL (ref 8.9–12.9)
POTASSIUM SERPL-SCNC: 3.5 MMOL/L (ref 3.5–5.1)
PROMYELOCYTES NFR BLD MANUAL: 0 %
PROT SERPL-MCNC: 8.4 G/DL (ref 6.4–8.2)
PROT UR STRIP-MCNC: NEGATIVE MG/DL
RBC # BLD AUTO: 4.55 M/UL (ref 3.8–5.2)
RBC #/AREA URNS HPF: ABNORMAL /HPF (ref 0–5)
RBC MORPH BLD: ABNORMAL
SAMPLES BEING HELD,HOLD: NORMAL
SARS-COV-2, COV2: DETECTED
SODIUM SERPL-SCNC: 139 MMOL/L (ref 136–145)
SP GR UR REFRACTOMETRY: 1.02 (ref 1–1.03)
UR CULT HOLD, URHOLD: NORMAL
UROBILINOGEN UR QL STRIP.AUTO: 1 EU/DL (ref 0.2–1)
WBC # BLD AUTO: 5.8 K/UL (ref 3.6–11)
WBC URNS QL MICRO: ABNORMAL /HPF (ref 0–4)

## 2022-11-17 PROCEDURE — 87636 SARSCOV2 & INF A&B AMP PRB: CPT

## 2022-11-17 PROCEDURE — 81001 URINALYSIS AUTO W/SCOPE: CPT

## 2022-11-17 PROCEDURE — 99284 EMERGENCY DEPT VISIT MOD MDM: CPT

## 2022-11-17 PROCEDURE — 96360 HYDRATION IV INFUSION INIT: CPT

## 2022-11-17 PROCEDURE — 74011250636 HC RX REV CODE- 250/636: Performed by: PEDIATRICS

## 2022-11-17 PROCEDURE — 74011250637 HC RX REV CODE- 250/637: Performed by: PEDIATRICS

## 2022-11-17 PROCEDURE — 74011000250 HC RX REV CODE- 250: Performed by: PEDIATRICS

## 2022-11-17 PROCEDURE — 83690 ASSAY OF LIPASE: CPT

## 2022-11-17 PROCEDURE — 80053 COMPREHEN METABOLIC PANEL: CPT

## 2022-11-17 PROCEDURE — 81025 URINE PREGNANCY TEST: CPT

## 2022-11-17 PROCEDURE — 36415 COLL VENOUS BLD VENIPUNCTURE: CPT

## 2022-11-17 PROCEDURE — 85027 COMPLETE CBC AUTOMATED: CPT

## 2022-11-17 RX ORDER — OMEPRAZOLE 20 MG/1
20 CAPSULE, DELAYED RELEASE ORAL DAILY
Qty: 14 CAPSULE | Refills: 0 | Status: SHIPPED | OUTPATIENT
Start: 2022-11-17

## 2022-11-17 RX ORDER — ONDANSETRON 4 MG/1
4 TABLET, ORALLY DISINTEGRATING ORAL
Status: COMPLETED | OUTPATIENT
Start: 2022-11-17 | End: 2022-11-17

## 2022-11-17 RX ORDER — POLYETHYLENE GLYCOL 3350 17 G/17G
17 POWDER, FOR SOLUTION ORAL DAILY
COMMUNITY

## 2022-11-17 RX ADMIN — SODIUM CHLORIDE 1000 ML: 9 INJECTION, SOLUTION INTRAVENOUS at 22:33

## 2022-11-17 RX ADMIN — ONDANSETRON 4 MG: 4 TABLET, ORALLY DISINTEGRATING ORAL at 22:23

## 2022-11-17 RX ADMIN — Medication 40 ML: at 22:23

## 2022-11-17 NOTE — PROGRESS NOTES
HPRP Outreach     Call placed to pt, Verified  and address for HIPAA security. Purpose of TC     Pt was seen at [facility] ER 22  Diagnosis: Irritable bowel syndrome with constipation     Pt was seen at [facility] ER 22  Diagnosis: RUQ pain     Pt was seen at THE [facility] ER 10/29/22     Diagnosis Comment   RUQ pain     Hypoglycemia        Pt was seen at [facility] ER 10/16/22     Diagnosis Comment   Abdominal pain, epigastric     Constipation, unspecified constipation type           TC details    Abdominal pain - pt reported abdominal pain 8/10 at this time due to 1 episode of vomiting undigested food a few minutes ago following eating a bowl of cereal. Prior to vomiting, pt was not having any pain. Pt reported this was the first time she has vomited within the past week. Advised to consume a bland diet and to increase water intake. No other concerns voiced at this time. BM - reported having a formed stool light brown in color stool this AM, denies bleed. Pt is now taking Miralax daily to prevent constipation. Meds - pt reported she now has all her GI meds that were prescribed to her in the ER on 22 and is taking them as prescribed. Pt did not start taking meds until 2 days ago. Unclear of delay in starting. Pt still has not restarted Cymbalta after running out. Pt has been advised multiple times to have PCP send a script [facility] pharmacy for local pick-up and then send a 3 mo supply to [facility]. Advised pt to  send a [email or web address].Me message to PCP after call today to request the following and to follow-up first thing in the AM.     Appt's - attended appt with PCP 11/15/22 and GI - 22. Pt is scheduled for an upper GI - 22     Plan of action  Provide support to patient. CM will follow-up in 2 week     Goals         Care Coordination related to abdominal pain (pt-stated)       Establish PCP relationships and regularly scheduled appointments.        PCP - 10/24/22  GI - 10/19/22    10/24/22 Call placed to patient, no answer. 10/31/22  Pt was seen at THE [facility] ER 10/29/22  Diagnosis Comment   RUQ pain    Hypoglycemia    GI - attended appt 10/19/22 follow-up TBD  PCP - TBD     10/7/22  Pt was seen at [facility] ER 11/4/22  Diagnosis: RUQ pain  PCP - 11/15/22  GI - Jan, but CM will call for an appt ASAP    11/8/22  Pt was seen at [facility] ER 11/8/22  Diagnosis: Irritable bowel syndrome with constipation  PCP - 11/15/22  GI  11/16/22 11/17/22  PCP - attended appt 11/15/22   GI - attended appt 11/16/22 follow-up after upper GI  Upper GI - 12/5/22        Knowledge and adherence to medication plan. Taking prescribed meds    11/17/22 still hasn't resume Cymbalta. Need script sent to [address] ED Utilization       Pt has been seen 6 times this year in the ER        Supportive resources in place to maintain patient in the community (ie., home health, equipment, DME, refer to, etc.)       [facility] - # [phone] 24/7  Nurse Access line 24/7  If you have COVID-19 symptoms, have tested positive for COVID-19 or have been exposed to someone with COVID-19, call the Nurse Access Line at [phone] and select option 8. Be Well  [address] [facility] 97 Urgent [facility] [phone]  HR Service Now - [facility] Workday  IT - [phone]  [facility] Help - - [phone]  Leave of absence from work (other than jury duty and bereavement), call [phone] option 1 or call the dedicated line at [facility] ([phone]). [employer] agents are available weekdays 8:30 a.m. - 10:30 p.m. ET. You also may: [place] for website access at [facility]. Download the [employer] mobile cathryn on or after Jan. 1 for on-the-go access to your team reports. Submit information by fax to [phone]. [employer] - [phone] Go to [employer] on the Internet or your mobile device and enter the password [email or web address] to access resources, educational information, and self-service options.

## 2022-11-17 NOTE — Clinical Note
Pain 6/10   [address]  [address] [facility] DEPT  [address] [facility] [id number] [phone]    Work/School Note    Date: 11/17/2022     To Whom It May concern:    Bethany Ng was evaluated by the following provider(s):  Attending Provider: Jimmy Carrasco MD.   [address] virus is suspected. Per the CDC guidelines we recommend home isolation until the following conditions are all met:    1. At least five days have passed since symptoms first appeared and/or had a close exposure,   2. After home isolation for five days, wearing a mask around others for the next five days,  3. At least 24 have passed since last fever without the use of fever-reducing medications and  4.  Symptoms (eg cough, shortness of breath) have improved      Sincerely,          Ritu Rocha MD

## 2022-11-18 ENCOUNTER — PATIENT OUTREACH (OUTPATIENT)
Dept: OTHER | Age: 22
End: 2022-11-18

## 2022-11-18 NOTE — PROGRESS NOTES
HPRP Outreach     Call placed to patient, no answer. Voicemail left to return call. Purpose of TC    Pt seen at [facility] ER 11/17/22  Diagnosis: COVID positive    Pt was seen at [facility] ER 11/8/22  Diagnosis: Irritable bowel syndrome with constipation     Pt was seen at [facility] ER 11/4/22  Diagnosis: RUQ pain     Pt was seen at THE [facility] ER 10/29/22     Diagnosis Comment   RUQ pain     Hypoglycemia        Pt was seen at [facility] ER 10/16/22     Diagnosis Comment   Abdominal pain, epigastric     Constipation, unspecified constipation type           TC details     Call placed to patient, no answer. Voicemail left to return call. Plan of action  Provide support to patient. CM will follow-up in 3 days      Goals         Care Coordination related to abdominal pain (pt-stated)       Establish PCP relationships and regularly scheduled appointments. PCP - 10/24/22  GI - 10/19/22    10/24/22 Call placed to patient, no answer. 10/31/22  Pt was seen at THE [facility] ER 10/29/22  Diagnosis Comment   RUQ pain    Hypoglycemia    GI - attended appt 10/19/22 follow-up TBD  PCP - TBD     10/7/22  Pt was seen at [facility] ER 11/4/22  Diagnosis: RUQ pain  PCP - 11/15/22  GI - Arjun, but CM will call for an appt ASAP    11/8/22  Pt was seen at [facility] ER 11/8/22  Diagnosis: Irritable bowel syndrome with constipation  PCP - 11/15/22  GI  11/16/22 11/17/22  PCP - attended appt 11/15/22   GI - attended appt 11/16/22 follow-up after upper GI  Upper GI - 12/5/22 11/18/22  Pt was seen at [facility] ER 11/17/22  Diagnosis: COVID positive  Call placed to patient, no answer. Voicemail left to return call. Knowledge and adherence to medication plan. Taking prescribed meds    11/17/22 still hasn't resume Cymbalta.  Need script sent to [facility] ED Utilization       Pt has been seen 6 times this year in the ER        Supportive resources in place to maintain patient in the community (ie., home health, equipment, DME, refer to, etc.)       [facility] - # [phone]  [address] 24/7  Nurse Access line 24/7  If you have COVID-19 symptoms, have tested positive for COVID-19 or have been exposed to someone with COVID-19, call the Nurse Access Line at [phone] and select option 8. Be Well  [address] [facility] 97 Urgent [facility] [phone]  HR Service Now - [facility] Workday  IT - [phone]  MyChart Help - 1- [phone]  Leave of absence from work (other than jury duty and bereavement), call [phone] option 1 or call the dedicated line at [address] [place] [address] ([phone]). [employer] agents are available weekdays 8:30 a.m. - 10:30 p.m. ET. You also may: [place] for website access at [facility]. Download the [employer] mobile cathryn on or after Jan. 1 for on-the-go access to your team reports. Submit information by fax to [phone]. [employer] - [phone] Go to [employer] on the Internet or your mobile device and enter the password [email or web address] to access resources, educational information, and self-service options.

## 2022-11-18 NOTE — ED TRIAGE NOTES
Triage note: Patient arrives to ED w/ cough, vomiting, abdominal pain, chills. Patient states that she has been having vomiting/abd pain/back pain for past 2 months and is followed by GI/scheduled for upper GI study. Providers unsure of dx. Lightheaded, several episodes of vomiting today.

## 2022-11-18 NOTE — TELEPHONE ENCOUNTER
Location of patient: [place]    Subjective: Caller states stomach pains, not tolerating PO, vomiting. Current Symptoms: vomiting, abd pain, chest pain, dizziness upon standing. Onset: several hours ago; gradual    Associated Symptoms: NA    Pain Severity: 8/10; pain; constant    Temperature: no fever reported by unknown method    What has been tried: self monitoring throughout the day    LMP: NA Pregnant: NA    Recommended disposition: Go to ED Now    Care advice provided, patient verbalizes understanding; denies any other questions or concerns; instructed to call back for any new or worsening symptoms. Patient/caller agrees to proceed to closest appropriate Emergency Department    Attention Provider: Thank you for allowing me to participate in the care of your patient. The patient was connected to triage in response to symptoms provided. Please do not respond through this encounter as the response is not directed to a shared pool.     Reason for Disposition   [1] SEVERE pain (e.g., excruciating) AND [2] present > 1 hour    Protocols used: Abdominal Pain - Female-ADULT-

## 2022-11-18 NOTE — DISCHARGE INSTRUCTIONS
Isolate at home as per CDC guidelines which you have been provided, you may use omeprazole once daily for 2 weeks for your gastritis and please follow-up with your regular provider in 2 to 3 days and with GI as scheduled. Return to the emergency department for increased work of breathing or any concerns.

## 2022-11-18 NOTE — ED PROVIDER NOTES
HPI patient is a 82-year-old woman with a history of recurrent GI issues who is scheduled for an endoscopy in several weeks who presents with coughing abdominal pain chills and vomiting. She complains of muscle pain, hip pain, upper quadrant abdominal pain that radiates to her back. The vomit is nonbloody and nonbilious and she is had no diarrhea. History reviewed. No pertinent past medical history. Past Surgical History:   Procedure Laterality Date    HX ORTHOPAEDIC      right ACL    HX TYMPANOSTOMY           History reviewed. No pertinent family history. Social History     Socioeconomic History    Marital status: SINGLE     Spouse name: Not on file    Number of children: Not on file    Years of education: Not on file    Highest education level: Not on file   Occupational History    Not on file   Tobacco Use    Smoking status: Never    Smokeless tobacco: Never   Vaping Use    Vaping Use: Never used   Substance and Sexual Activity    Alcohol use: Never    Drug use: Never    Sexual activity: Yes     Partners: Male     Birth control/protection: Injection, None   Other Topics Concern    Not on file   Social History Narrative    Not on file     Social Determinants of Health     Financial Resource Strain: Not on file   Food Insecurity: Not on file   Transportation Needs: Not on file   Physical Activity: Inactive    Days of Exercise per Week: 0 days    Minutes of Exercise per Session: 0 min   Stress: Not on file   Social Connections: Not on file   Intimate Partner Violence: Not At Risk    Fear of Current or Ex-Partner: No    Emotionally Abused: No    Physically Abused: No    Sexually Abused: No   Housing Stability: Not on file   Medications: Depo-Provera, duloxetine  Immunizations: Up-to-date  Social history: Patient does not smoke or drink or use drugs. She is sexually active and states she is not pregnant and denies trauma.       ALLERGIES: Pcn [penicillins]    Review of Systems   Constitutional:  Negative for unexpected weight change. HENT:  Positive for congestion. Respiratory:  Positive for cough. Gastrointestinal:  Positive for abdominal pain and vomiting. Negative for diarrhea. Musculoskeletal:  Positive for myalgias. All other systems reviewed and are negative. Vitals:    11/17/22 2136   BP: 132/73   Pulse: 80   Resp: 20   Temp: 99.5 °F (37.5 °C)   SpO2: 99%   Weight: 62.8 kg (138 lb 7.2 oz)            Physical Exam  Vitals and nursing note reviewed. Constitutional:       General: She is not in acute distress. HENT:      Head: Normocephalic and atraumatic. Mouth/Throat:      Mouth: Mucous membranes are moist.   Eyes:      Extraocular Movements: Extraocular movements intact. Cardiovascular:      Rate and Rhythm: Normal rate and regular rhythm. Heart sounds: Normal heart sounds. No murmur heard. No friction rub. No gallop. Pulmonary:      Effort: Pulmonary effort is normal. No respiratory distress. Breath sounds: Normal breath sounds. No stridor. No wheezing, rhonchi or rales. Abdominal:      General: Abdomen is flat. Palpations: Abdomen is soft. Tenderness: There is abdominal tenderness in the epigastric area. Skin:     General: Skin is warm. Neurological:      General: No focal deficit present. Mental Status: She is alert. MDM  Number of Diagnoses or Management Options  Diagnosis management comments: 82-year-old woman with a history of chronic abdominal pain who presents with influenza-like illness symptoms and has epigastric pain. Please IV, obtain baseline screening labs, obtain test for influenza and COVID-19, bolus IV fluids, treat with GI cocktail, reassess.     Labs Reviewed   COVID-19 WITH INFLUENZA A/B - Abnormal; Notable for the following components:       Result Value    SARS-CoV-2 by PCR Detected (*)     All other components within normal limits   METABOLIC PANEL, COMPREHENSIVE - Abnormal; Notable for the following components: BUN/Creatinine ratio 9 (*)     AST (SGOT) 10 (*)     Protein, total 8.4 (*)     Globulin 4.1 (*)     A-G Ratio 1.0 (*)     All other components within normal limits   URINALYSIS W/MICROSCOPIC - Abnormal; Notable for the following components:    Leukocyte Esterase SMALL (*)     Bacteria 1+ (*)     All other components within normal limits   URINE CULTURE HOLD SAMPLE   CBC WITH MANUAL DIFF   LIPASE   SAMPLES BEING HELD   HCG URINE, QL. - POC     11:41 PM  Labs reviewed, patient has acute COVID-19 and the rest of her labs are reassuring. She says she still feels uncomfortable which is understandable given she has COVID-19 however patient has a nonsurgical abdomen and is clinically stable to discharge. Will discharge with prescription for Tylenol and is to follow-up the primary care physician in 2 to 3 days. To isolate at home per [facility] guidelines.        Procedures

## 2022-11-21 ENCOUNTER — PATIENT OUTREACH (OUTPATIENT)
Dept: OTHER | Age: 22
End: 2022-11-21

## 2022-11-21 NOTE — PROGRESS NOTES
Patient contacted regarding COVID-19 diagnosis. Discussed COVID-19 related testing which was available at this time. Test results were positive. Patient informed of results, if available? yes. Pt was seen at [facility] ER 22    Pt reported COVID s/s are better. Denies temp, N/V or SOB. Does report productive cough, no worse. Pt continues to have ULQ pain 6/10 without pain med, which has been going on for weeks. Pt has seen both PCP and GI. Scheduled fir UGI 22    Ambulatory Care Manager contacted the patient by telephone to perform post discharge assessment. Call within 2 business days of discharge: Yes Verified name and  with patient as identifiers. Provided introduction to self, and explanation of the CTN/ACM role, and reason for call due to risk factors for infection and/or exposure to COVID-19. Symptoms reviewed with patient who verbalized the following symptoms: cough      Due to no new or worsening symptoms encounter was not routed to provider for escalation. Discussed follow-up appointments. If no appointment was previously scheduled, appointment scheduling offered:  yes. [facility] follow up appointment(s):   Future Appointments   Date Time Provider Krunal Dee   2022  7:30 AM [facility]   2023  1:00 PM Rodrick Garcia NP [facility]     Non-BS follow up appointment(s): GI - 22    Interventions to address risk factors: Scheduled appointment with PCP-TBD, Obtained and reviewed discharge summary and/or continuity of care documents, and provided alternatives to the ER     Advance Care Planning:   Does patient have an Advance Directive: not on file. Educated patient about risk for severe COVID-19 due to risk factors according to CDC guidelines. ACM reviewed discharge instructions, medical action plan and red flag symptoms with the patient who verbalized understanding.  Discussed COVID vaccination status: no. Education provided on COVID-19 vaccination as appropriate. Discussed exposure protocols and quarantine with CDC Guidelines. Patient was given an opportunity to verbalize any questions and concerns and agrees to contact ACM or health care provider for questions related to their healthcare. Reviewed and educated patient on any new and changed medications related to discharge diagnosis     Was patient discharged with a pulse oximeter? no Discussed and confirmed pulse oximeter discharge instructions and when to notify provider or seek emergency care. ACM provided contact information. Plan for follow-up call in 5-7 days based on severity of symptoms and risk factors.

## 2022-12-05 ENCOUNTER — PATIENT MESSAGE (OUTPATIENT)
Dept: INTERNAL MEDICINE CLINIC | Age: 22
End: 2022-12-05

## 2022-12-05 ENCOUNTER — HOSPITAL ENCOUNTER (OUTPATIENT)
Dept: GENERAL RADIOLOGY | Age: 22
Discharge: HOME | End: 2022-12-05
Attending: NURSE PRACTITIONER
Payer: COMMERCIAL

## 2022-12-05 DIAGNOSIS — K59.04 CHRONIC IDIOPATHIC CONSTIPATION: ICD-10-CM

## 2022-12-05 DIAGNOSIS — R11.2 NAUSEA WITH VOMITING: ICD-10-CM

## 2022-12-05 DIAGNOSIS — R19.4 CHANGE IN BOWEL HABITS: ICD-10-CM

## 2022-12-05 PROCEDURE — 74248 X-RAY SM INT F-THRU STD: CPT

## 2022-12-06 ENCOUNTER — PATIENT OUTREACH (OUTPATIENT)
Dept: OTHER | Age: 22
End: 2022-12-06

## 2022-12-06 ENCOUNTER — NURSE TRIAGE (OUTPATIENT)
Dept: OTHER | Facility: CLINIC | Age: 22
End: 2022-12-06

## 2022-12-06 NOTE — TELEPHONE ENCOUNTER
Reason for Disposition   [1] MILD-MODERATE post-op pain (e.g., pain scale 1-7) AND [2] not controlled with pain medications    Protocols used: [facility]    Location of patient: [place]    Subjective: Caller states \"I had an upper Gi yesterday and still having abd pain and my stools are tan\"     Current Symptoms: Caller reports she is still having abdominal pain in the same spot as she has had the past few weeks. She also reports her stool is tan. Advised Barium can change the color of stool     Onset: 1 day    Associated Symptoms: Abd pain    Pain Severity: 6/10    Temperature: Denies fever    What has been tried: Tylenol    LMP: NA Pregnant: NA    Recommended disposition: See PCP within 24 Hours    Care advice provided, patient verbalizes understanding; denies any other questions or concerns; instructed to call back for any new or worsening symptoms. Attention Provider: Thank you for allowing me to participate in the care of your patient. The patient was connected to triage in response to symptoms provided. Please do not respond through this encounter as the response is not directed to a shared pool.

## 2022-12-06 NOTE — PROGRESS NOTES
Patient contacted regarding COVID-19 diagnosis. Discussed COVID-19 related testing which was available at this time. Test results were positive. Patient informed of results, if available? yes      Ambulatory Care Manager contacted the patient by telephone to perform follow-up assessment. Verified name and  with patient as identifiers. Patient has following risk factors of: no known risk factors. Symptoms reviewed with patient who verbalized the following symptoms: no new symptoms. Due to no new or worsening symptoms encounter was not routed to provider for escalation. Pt continues to report ongoing issues with abdominal pain prior to COVID dx. Pt completed UGI, results pending. Pt reported she continues to have abdominal pain, slightly better today. Reported 5/10 on pain scale without pain med. Pt reported she had a BM today describing as, \"rabbit pellets that was tan in color. \" Pt reported she contacted nurse line and was advised to contact GI. Nurse explained to pt that the color maybe due to barium she consumed. Pt is currently not take Miralax due to an episode of diarrhea 3 days ago. Advised pt to contact GI in AM.     Interventions to address risk factors: Scheduled appointment with PCP-attended appt, Scheduled appointment with Specialist-GI - attended appt, and Obtained and reviewed discharge summary and/or continuity of care documents    Educated patient about risk for severe COVID-19 due to risk factors according to CDC guidelines. ACM reviewed discharge instructions, medical action plan and red flag symptoms with the patient who verbalized understanding. Discussed COVID vaccination status: yes. Education provided on COVID-19 vaccination as appropriate. Discussed exposure protocols and quarantine with CDC Guidelines. Patient was given an opportunity to verbalize any questions and concerns and agrees to contact ACM or health care provider for questions related to their healthcare.     Reviewed and educated patient on any new and changed medications related to discharge diagnosis     Was patient discharged with a pulse oximeter? no Discussed and confirmed pulse oximeter discharge instructions and when to notify provider or seek emergency care. ACM provided contact information. 14 days  based on severity of symptoms and risk factors.

## 2022-12-08 ENCOUNTER — NURSE TRIAGE (OUTPATIENT)
Dept: OTHER | Facility: CLINIC | Age: 22
End: 2022-12-08

## 2022-12-08 NOTE — TELEPHONE ENCOUNTER
Location of patient: [place]    Subjective: Caller states \"tan stool, hip pain and headache, dizziness\"     Current Symptoms: tan stool, blood stool, hip pain and headache, dizziness    Onset: 4 day ago; worsening    Associated Symptoms: reduced appetite    Pain Severity: 6/10; sharp, aching, throbbing; constant    Temperature: denies    What has been tried: nothing    LMP:  on deppo  Pregnant: No    Recommended disposition: See PCP within 3 Days    Care advice provided, patient verbalizes understanding; denies any other questions or concerns; instructed to call back for any new or worsening symptoms. Patient/caller agrees to follow-up with PCP     Attention Provider: Thank you for allowing me to participate in the care of your patient. The patient was connected to triage in response to symptoms provided. Please do not respond through this encounter as the response is not directed to a shared pool.         Reason for Disposition   Rectal bleeding, bloody stools, or blood in stool (bowel movement)   MILD rectal bleeding (more than just a few drops or streaks)    Protocols used: Stools - Unusual Color-ADULT-AH, Rectal Bleeding-ADULT-AH

## 2022-12-14 NOTE — TELEPHONE ENCOUNTER
Pt called and states having abd pain and cramping since starting the Linzess. Pt denies blood in stool. Pt describes stool as yellow and powdery. Pt had a Upper GI 12/5/2022. Since having pain and cramping told patient to stop Linzess and call her GI physician. PT states she had tried and they did not return call.

## 2022-12-15 ENCOUNTER — APPOINTMENT (OUTPATIENT)
Dept: GENERAL RADIOLOGY | Age: 22
End: 2022-12-15
Attending: EMERGENCY MEDICINE
Payer: COMMERCIAL

## 2022-12-15 ENCOUNTER — HOSPITAL ENCOUNTER (EMERGENCY)
Age: 22
Discharge: HOME | End: 2022-12-15
Attending: EMERGENCY MEDICINE
Payer: COMMERCIAL

## 2022-12-15 ENCOUNTER — NURSE TRIAGE (OUTPATIENT)
Dept: OTHER | Facility: CLINIC | Age: 22
End: 2022-12-15

## 2022-12-15 VITALS
HEART RATE: 76 BPM | OXYGEN SATURATION: 100 % | BODY MASS INDEX: 21.99 KG/M2 | RESPIRATION RATE: 20 BRPM | SYSTOLIC BLOOD PRESSURE: 120 MMHG | WEIGHT: 140.43 LBS | TEMPERATURE: 98.9 F | DIASTOLIC BLOOD PRESSURE: 77 MMHG

## 2022-12-15 DIAGNOSIS — R11.2 NAUSEA AND VOMITING, UNSPECIFIED VOMITING TYPE: ICD-10-CM

## 2022-12-15 DIAGNOSIS — R10.816 EPIGASTRIC ABDOMINAL TENDERNESS WITHOUT REBOUND TENDERNESS: Primary | ICD-10-CM

## 2022-12-15 LAB
ALBUMIN SERPL-MCNC: 4.6 G/DL (ref 3.5–5)
ALBUMIN/GLOB SERPL: 1.2 {RATIO} (ref 1.1–2.2)
ALP SERPL-CCNC: 52 U/L (ref 45–117)
ALT SERPL-CCNC: 15 U/L (ref 12–78)
ANION GAP SERPL CALC-SCNC: 4 MMOL/L (ref 5–15)
APPEARANCE UR: CLEAR
AST SERPL-CCNC: 10 U/L (ref 15–37)
BACTERIA URNS QL MICRO: NEGATIVE /HPF
BASOPHILS # BLD: 0 K/UL (ref 0–0.1)
BASOPHILS NFR BLD: 0 % (ref 0–1)
BILIRUB SERPL-MCNC: 0.7 MG/DL (ref 0.2–1)
BILIRUB UR QL: NEGATIVE
BUN SERPL-MCNC: 12 MG/DL (ref 6–20)
BUN/CREAT SERPL: 15 (ref 12–20)
CALCIUM SERPL-MCNC: 9.2 MG/DL (ref 8.5–10.1)
CHLORIDE SERPL-SCNC: 105 MMOL/L (ref 97–108)
CO2 SERPL-SCNC: 28 MMOL/L (ref 21–32)
COLOR UR: NORMAL
COMMENT, HOLDF: NORMAL
CREAT SERPL-MCNC: 0.79 MG/DL (ref 0.55–1.02)
DIFFERENTIAL METHOD BLD: NORMAL
EOSINOPHIL # BLD: 0.1 K/UL (ref 0–0.4)
EOSINOPHIL NFR BLD: 1 % (ref 0–7)
EPITH CASTS URNS QL MICRO: NORMAL /LPF
ERYTHROCYTE [DISTWIDTH] IN BLOOD BY AUTOMATED COUNT: 12.4 % (ref 11.5–14.5)
GLOBULIN SER CALC-MCNC: 3.7 G/DL (ref 2–4)
GLUCOSE SERPL-MCNC: 91 MG/DL (ref 65–100)
GLUCOSE UR STRIP.AUTO-MCNC: NEGATIVE MG/DL
HCG UR QL: NEGATIVE
HCT VFR BLD AUTO: 36.1 % (ref 35–47)
HGB BLD-MCNC: 12.3 G/DL (ref 11.5–16)
HGB UR QL STRIP: NEGATIVE
IMM GRANULOCYTES # BLD AUTO: 0 K/UL (ref 0–0.04)
IMM GRANULOCYTES NFR BLD AUTO: 0 % (ref 0–0.5)
KETONES UR QL STRIP.AUTO: NEGATIVE MG/DL
LEUKOCYTE ESTERASE UR QL STRIP.AUTO: NEGATIVE
LIPASE SERPL-CCNC: 104 U/L (ref 73–393)
LYMPHOCYTES # BLD: 2.2 K/UL (ref 0.8–3.5)
LYMPHOCYTES NFR BLD: 37 % (ref 12–49)
MCH RBC QN AUTO: 29.4 PG (ref 26–34)
MCHC RBC AUTO-ENTMCNC: 34.1 G/DL (ref 30–36.5)
MCV RBC AUTO: 86.2 FL (ref 80–99)
MONOCYTES # BLD: 0.5 K/UL (ref 0–1)
MONOCYTES NFR BLD: 9 % (ref 5–13)
NEUTS SEG # BLD: 3.1 K/UL (ref 1.8–8)
NEUTS SEG NFR BLD: 53 % (ref 32–75)
NITRITE UR QL STRIP.AUTO: NEGATIVE
NRBC # BLD: 0 K/UL (ref 0–0.01)
NRBC BLD-RTO: 0 PER 100 WBC
PH UR STRIP: 6 [PH] (ref 5–8)
PLATELET # BLD AUTO: 239 K/UL (ref 150–400)
PMV BLD AUTO: 10.6 FL (ref 8.9–12.9)
POTASSIUM SERPL-SCNC: 3.3 MMOL/L (ref 3.5–5.1)
PROT SERPL-MCNC: 8.3 G/DL (ref 6.4–8.2)
PROT UR STRIP-MCNC: NEGATIVE MG/DL
RBC # BLD AUTO: 4.19 M/UL (ref 3.8–5.2)
RBC #/AREA URNS HPF: NORMAL /HPF (ref 0–5)
SAMPLES BEING HELD,HOLD: NORMAL
SODIUM SERPL-SCNC: 137 MMOL/L (ref 136–145)
SP GR UR REFRACTOMETRY: 1.01 (ref 1–1.03)
UR CULT HOLD, URHOLD: NORMAL
UROBILINOGEN UR QL STRIP.AUTO: 0.2 EU/DL (ref 0.2–1)
WBC # BLD AUTO: 6 K/UL (ref 3.6–11)
WBC URNS QL MICRO: NORMAL /HPF (ref 0–4)

## 2022-12-15 PROCEDURE — 96374 THER/PROPH/DIAG INJ IV PUSH: CPT

## 2022-12-15 PROCEDURE — C9113 INJ PANTOPRAZOLE SODIUM, VIA: HCPCS | Performed by: EMERGENCY MEDICINE

## 2022-12-15 PROCEDURE — 36415 COLL VENOUS BLD VENIPUNCTURE: CPT

## 2022-12-15 PROCEDURE — 85025 COMPLETE CBC W/AUTO DIFF WBC: CPT

## 2022-12-15 PROCEDURE — 74011250636 HC RX REV CODE- 250/636: Performed by: EMERGENCY MEDICINE

## 2022-12-15 PROCEDURE — 81001 URINALYSIS AUTO W/SCOPE: CPT

## 2022-12-15 PROCEDURE — 81025 URINE PREGNANCY TEST: CPT

## 2022-12-15 PROCEDURE — 99284 EMERGENCY DEPT VISIT MOD MDM: CPT

## 2022-12-15 PROCEDURE — 74011000250 HC RX REV CODE- 250: Performed by: EMERGENCY MEDICINE

## 2022-12-15 PROCEDURE — 74011250637 HC RX REV CODE- 250/637: Performed by: EMERGENCY MEDICINE

## 2022-12-15 PROCEDURE — 74022 RADEX COMPL AQT ABD SERIES: CPT

## 2022-12-15 PROCEDURE — 96375 TX/PRO/DX INJ NEW DRUG ADDON: CPT

## 2022-12-15 PROCEDURE — 83690 ASSAY OF LIPASE: CPT

## 2022-12-15 PROCEDURE — 96361 HYDRATE IV INFUSION ADD-ON: CPT

## 2022-12-15 PROCEDURE — 80053 COMPREHEN METABOLIC PANEL: CPT

## 2022-12-15 RX ORDER — ONDANSETRON 2 MG/ML
4 INJECTION INTRAMUSCULAR; INTRAVENOUS ONCE
Status: COMPLETED | OUTPATIENT
Start: 2022-12-15 | End: 2022-12-15

## 2022-12-15 RX ORDER — ONDANSETRON 4 MG/1
4 TABLET, ORALLY DISINTEGRATING ORAL
Status: DISCONTINUED | OUTPATIENT
Start: 2022-12-15 | End: 2022-12-15

## 2022-12-15 RX ORDER — SUCRALFATE 1 G/1
1 TABLET ORAL 4 TIMES DAILY
Qty: 56 TABLET | Refills: 0 | Status: SHIPPED | OUTPATIENT
Start: 2022-12-15 | End: 2022-12-29

## 2022-12-15 RX ORDER — PANTOPRAZOLE SODIUM 40 MG/1
40 TABLET, DELAYED RELEASE ORAL DAILY
Qty: 30 TABLET | Refills: 0 | Status: SHIPPED | OUTPATIENT
Start: 2022-12-15 | End: 2023-01-04

## 2022-12-15 RX ORDER — ONDANSETRON 4 MG/1
4 TABLET, ORALLY DISINTEGRATING ORAL
Qty: 10 TABLET | Refills: 0 | Status: SHIPPED | OUTPATIENT
Start: 2022-12-15

## 2022-12-15 RX ADMIN — SODIUM CHLORIDE 1000 ML: 9 INJECTION, SOLUTION INTRAVENOUS at 09:38

## 2022-12-15 RX ADMIN — ALUMINUM HYDROXIDE AND MAGNESIUM HYDROXIDE 30 ML: 200; 200 SUSPENSION ORAL at 11:28

## 2022-12-15 RX ADMIN — PANTOPRAZOLE SODIUM 40 MG: 40 INJECTION, POWDER, FOR SOLUTION INTRAVENOUS at 09:32

## 2022-12-15 RX ADMIN — ONDANSETRON HYDROCHLORIDE 4 MG: 2 SOLUTION INTRAMUSCULAR; INTRAVENOUS at 09:32

## 2022-12-15 NOTE — ED PROVIDER NOTES
HPI     Please note that this dictation was completed with Bulb, the computer voice recognition software. Quite often unanticipated grammatical, syntax, homophones, and other interpretive errors are inadvertently transcribed by the computer software. Please disregard these errors. Please excuse any errors that have escaped final proofreading. 53-year-old female here with vomiting specks of blood. Patient began yesterday evening with abdominal pain across her mid abdomen and upper abdomen. As she began to have emesis, became more epigastric and lower chest.  She has had specks and flecks of blood in her emesis. Last bowel movement was yesterday which was formed. She has a bowel movement every other day. History of chronic constipation. Recent upper GI showing constipation. She is managed by gastrointestinal specialists but cannot recall the name there. She does not take any chronic constipation medications. Denies any bloody stools, urinary complaints, fevers, chills or other complaints. Denies any NSAID or aspirin use. History reviewed. No pertinent past medical history. Past Surgical History:   Procedure Laterality Date    HX ORTHOPAEDIC      right ACL    HX TYMPANOSTOMY           History reviewed. No pertinent family history.     Social History     Socioeconomic History    Marital status: SINGLE     Spouse name: Not on file    Number of children: Not on file    Years of education: Not on file    Highest education level: Not on file   Occupational History    Not on file   Tobacco Use    Smoking status: Never    Smokeless tobacco: Never   Vaping Use    Vaping Use: Never used   Substance and Sexual Activity    Alcohol use: Never    Drug use: Never    Sexual activity: Yes     Partners: Male     Birth control/protection: Injection, None   Other Topics Concern    Not on file   Social History Narrative    Not on file     Social Determinants of Health     Financial Resource Strain: Not on file Food Insecurity: Not on file   Transportation Needs: Not on file   Physical Activity: Inactive    Days of Exercise per Week: 0 days    Minutes of Exercise per Session: 0 min   Stress: Not on file   Social Connections: Not on file   Intimate Partner Violence: Not At Risk    Fear of Current or Ex-Partner: No    Emotionally Abused: No    Physically Abused: No    Sexually Abused: No   Housing Stability: Not on file         ALLERGIES: Pcn [penicillins]    Review of Systems   Constitutional:  Negative for chills and fever. HENT:  Negative for congestion and rhinorrhea. Gastrointestinal:  Positive for constipation, nausea and vomiting. Negative for abdominal pain. Genitourinary:  Negative for dysuria. All other systems reviewed and are negative. Vitals:    12/15/22 0855 12/15/22 0856   BP: 123/78    Pulse: 86    Resp: 18    Temp: 97.9 °F (36.6 °C)    SpO2: 99%    Weight:  63.7 kg (140 lb 6.9 oz)            Physical Exam  Vitals and nursing note reviewed. Constitutional:       Appearance: Normal appearance. She is well-developed. HENT:      Head: Normocephalic and atraumatic. Nose: No congestion or rhinorrhea. Mouth/Throat:      Mouth: Mucous membranes are moist.      Pharynx: No oropharyngeal exudate or posterior oropharyngeal erythema. Eyes:      General: No scleral icterus. Right eye: No discharge. Left eye: No discharge. Conjunctiva/sclera: Conjunctivae normal.   Cardiovascular:      Rate and Rhythm: Normal rate and regular rhythm. Heart sounds: Normal heart sounds. No murmur heard. No friction rub. No gallop. Pulmonary:      Effort: Pulmonary effort is normal. No respiratory distress. Breath sounds: Normal breath sounds. No wheezing or rales. Abdominal:      General: There is no distension. Palpations: Abdomen is soft. Tenderness: There is abdominal tenderness (upper abdomen and mid abdomen). There is no guarding.    Musculoskeletal: General: No swelling or deformity. Normal range of motion. Cervical back: Normal range of motion and neck supple. No rigidity. Right lower leg: No edema. Left lower leg: No edema. Lymphadenopathy:      Cervical: No cervical adenopathy. Skin:     General: Skin is warm and dry. Coloration: Skin is not jaundiced or pale. Findings: No rash. Neurological:      Mental Status: She is alert and oriented to person, place, and time. Comments: GURPREET            RITIKA       82-year-old female here with upper abdominal pain and discomfort with associated vomiting and small amount of hematemesis. Suspect hematemesis is likely Danielle-Curiel or similar such as gastritis. Differential diagnosis includes pancreatitis, constipation, gastritis, gastroenteritis and others. Check labs, acute abdominal series, IV fluids, Protonix and Zofran. Procedures    11:22 AM  Hemoglobin normal.  LFTs and lipase normal.  Patient has not vomited. Reviewed available results with her. Patient still complaining of epigastric pain. We will try p.o. Maalox. 2:00 PM  D/w Zulema Dumont NP, covering for the patient's gastroenterologist.  She recommends PPI with Protonix and then Carafate for 2 weeks. Follow-up with her primary gastroenterologist.  Patient did have some improvement in pain after Maalox. 2:00 PM  Patient's results have been reviewed with them. Patient and/or family have verbally conveyed their understanding and agreement of the patient's signs, symptoms, diagnosis, treatment and prognosis and additionally agree to follow up as recommended or return to the Emergency Room should their condition change prior to follow-up. Discharge instructions have also been provided to the patient with some educational information regarding their diagnosis as well a list of reasons why they would want to return to the ER prior to their follow-up appointment should their condition change.       Recent Results (from the past 24 hour(s))   CBC WITH AUTOMATED DIFF    Collection Time: 12/15/22  9:40 AM   Result Value Ref Range    WBC 6.0 3.6 - 11.0 K/uL    RBC 4.19 3.80 - 5.20 M/uL    HGB 12.3 11.5 - 16.0 g/dL    HCT 36.1 35.0 - 47.0 %    MCV 86.2 80.0 - 99.0 FL    MCH 29.4 26.0 - 34.0 PG    MCHC 34.1 30.0 - 36.5 g/dL    RDW 12.4 11.5 - 14.5 %    PLATELET 881 052 - 714 K/uL    MPV 10.6 8.9 - 12.9 FL    NRBC 0.0 0  WBC    ABSOLUTE NRBC 0.00 0.00 - 0.01 K/uL    NEUTROPHILS 53 32 - 75 %    LYMPHOCYTES 37 12 - 49 %    MONOCYTES 9 5 - 13 %    EOSINOPHILS 1 0 - 7 %    BASOPHILS 0 0 - 1 %    IMMATURE GRANULOCYTES 0 0.0 - 0.5 %    ABS. NEUTROPHILS 3.1 1.8 - 8.0 K/UL    ABS. LYMPHOCYTES 2.2 0.8 - 3.5 K/UL    ABS. MONOCYTES 0.5 0.0 - 1.0 K/UL    ABS. EOSINOPHILS 0.1 0.0 - 0.4 K/UL    ABS. BASOPHILS 0.0 0.0 - 0.1 K/UL    ABS. IMM. GRANS. 0.0 0.00 - 0.04 K/UL    DF AUTOMATED     METABOLIC PANEL, COMPREHENSIVE    Collection Time: 12/15/22  9:40 AM   Result Value Ref Range    Sodium 137 136 - 145 mmol/L    Potassium 3.3 (L) 3.5 - 5.1 mmol/L    Chloride 105 97 - 108 mmol/L    CO2 28 21 - 32 mmol/L    Anion gap 4 (L) 5 - 15 mmol/L    Glucose 91 65 - 100 mg/dL    BUN 12 6 - 20 MG/DL    Creatinine 0.79 0.55 - 1.02 MG/DL    BUN/Creatinine ratio 15 12 - 20      eGFR >60 >60 ml/min/1.73m2    Calcium 9.2 8.5 - 10.1 MG/DL    Bilirubin, total 0.7 0.2 - 1.0 MG/DL    ALT (SGPT) 15 12 - 78 U/L    AST (SGOT) 10 (L) 15 - 37 U/L    Alk.  phosphatase 52 45 - 117 U/L    Protein, total 8.3 (H) 6.4 - 8.2 g/dL    Albumin 4.6 3.5 - 5.0 g/dL    Globulin 3.7 2.0 - 4.0 g/dL    A-G Ratio 1.2 1.1 - 2.2     LIPASE    Collection Time: 12/15/22  9:40 AM   Result Value Ref Range    Lipase 104 73 - 393 U/L   SAMPLES BEING HELD    Collection Time: 12/15/22  9:52 AM   Result Value Ref Range    SAMPLES BEING HELD 1RED 1BC(SILV)     COMMENT        Add-on orders for these samples will be processed based on acceptable specimen integrity and analyte stability, which may vary by analyte. URINALYSIS W/MICROSCOPIC    Collection Time: 12/15/22 10:41 AM   Result Value Ref Range    Color YELLOW/STRAW      Appearance CLEAR CLEAR      Specific gravity 1.010 1.003 - 1.030      pH (UA) 6.0 5.0 - 8.0      Protein Negative NEG mg/dL    Glucose Negative NEG mg/dL    Ketone Negative NEG mg/dL    Bilirubin Negative NEG      Blood Negative NEG      Urobilinogen 0.2 0.2 - 1.0 EU/dL    Nitrites Negative NEG      Leukocyte Esterase Negative NEG      WBC PENDING /hpf    RBC PENDING /hpf    Epithelial cells PENDING /lpf    Bacteria PENDING /hpf   URINE CULTURE HOLD SAMPLE    Collection Time: 12/15/22 10:41 AM    Specimen: Urine   Result Value Ref Range    Urine culture hold        Urine on hold in Microbiology dept for 2 days. If unpreserved urine is submitted, it cannot be used for addtional testing after 24 hours, recollection will be required. HCG URINE, QL. - POC    Collection Time: 12/15/22 10:56 AM   Result Value Ref Range    Pregnancy test,urine (POC) Negative NEG         XR ABD ACUTE W 1 V CHEST    Result Date: 12/15/2022  ACUTE ABDOMINAL SERIES RADIOGRAPHS. 12/15/2022 10:05 AM INDICATION: Abdominal pain, vomiting, constipation. COMPARISON: 6/29/2022, 9/2/2021. TECHNIQUE: AP supine view/s of the abdomen. Upright AP view/s of the chest and abdomen. FINDINGS: The lungs are clear. The central airways are patent. No pneumothorax or pleural effusion. The bowel gas pattern is normal. No pneumoperitoneum. Clear lungs. Normal bowel gas pattern.

## 2022-12-15 NOTE — ED NOTES
Pt discharged home with parent/guardian. Pt acting age appropriately, respirations regular and unlabored, cap refill less than two seconds. Skin pink, dry and warm. Lungs clear bilaterally. No further complaints at this time. Parent/guardian verbalized understanding of discharge paperwork and has no further questions at this time. Education provided about continuation of care, follow up care and medication administration. Take medications as prescribed, follow up with GI. Parent/guardian able to provided teach back about discharge instructions.

## 2022-12-15 NOTE — TELEPHONE ENCOUNTER
Location of patient: [place]    Subjective: Caller states \"vomited blood\"     Current Symptoms: see above, chest pain, hard to breathe, abdominal pain. Had diarrhea yesterday that looked like sand. Recently started linzess, and then she started having pain in back and abdominal area last week. Was told to stop by PCP due to diarrhea. Vomited blood that was bright red once today, abdominal area was bloated and she felt nauseated. Onset: 10 minutes ago; sudden    Associated Symptoms: diarrhea    Pain Severity: 9/10; sharp; constant    Temperature: denies fever     What has been tried: nothing    LMP:  on depo  Pregnant: No    Recommended disposition: Go to ED Now    Care advice provided, patient verbalizes understanding; denies any other questions or concerns; instructed to call back for any new or worsening symptoms. Patient/caller agrees to proceed to [facility] Emergency Department    Attention Provider: Thank you for allowing me to participate in the care of your patient. The patient was connected to triage in response to symptoms provided. Please do not respond through this encounter as the response is not directed to a shared pool.       Reason for Disposition   Constant abdominal pain and present > 2 hours    Protocols used: Vomiting Blood-ADULT-[place]

## 2022-12-16 ENCOUNTER — PATIENT OUTREACH (OUTPATIENT)
Dept: OTHER | Age: 22
End: 2022-12-16

## 2022-12-16 NOTE — PROGRESS NOTES
[facility]PRP Outreach     Call placed to patient, no answer. Voicemail left to return call. Purpose of TC    Pt was seen at [address] [place] [address] ER 12/15/22  Diagnosis: N/V, abdominal pain     Pt seen at [address] [place] [address] ER 11/17/22  Diagnosis: COVID positive    Pt was seen at [facility] ER 11/8/22  Diagnosis: Irritable bowel syndrome with constipation     Pt was seen at [facility] ER 11/4/22  Diagnosis: RUQ pain     Pt was seen at [facility] ER 10/29/22     Diagnosis Comment   RUQ pain     Hypoglycemia        Pt was seen at [facility] ER 10/16/22     Diagnosis Comment   Abdominal pain, epigastric     Constipation, unspecified constipation type           TC details     Call placed to patient, no answer. Voicemail left to return call. Plan of action  Provide support to patient.   CM will follow-up as previously scheduled

## 2022-12-20 ENCOUNTER — PATIENT OUTREACH (OUTPATIENT)
Dept: OTHER | Age: 22
End: 2022-12-20

## 2022-12-20 NOTE — PROGRESS NOTES
[facility]PRP Outreach      Call placed to patient, no answer. # not in service. VoiceTrusthart message sent to pt. Purpose of TC     Pt was seen at [facility] ER 12/15/22  Diagnosis: N/V, abdominal pain      Pt seen at [facility] ER 11/17/22  Diagnosis: COVID positive     Pt was seen at [facility] ER 11/8/22  Diagnosis: Irritable bowel syndrome with constipation     Pt was seen at [facility] ER 11/4/22  Diagnosis: RUQ pain     Pt was seen at [facility] ER 10/29/22     Diagnosis Comment   RUQ pain     Hypoglycemia        Pt was seen at [facility] ER 10/16/22     Diagnosis Comment   Abdominal pain, epigastric     Constipation, unspecified constipation type           TC details     Call placed to patient, no answer. # not in service. VoiceTrusthart message sent to pt. Plan of action  Provide support to patient. CM will follow-up in 2 weeks      Goals         Care Coordination related to abdominal pain (pt-stated)       Establish PCP relationships and regularly scheduled appointments. PCP - 10/24/22  GI - 10/19/22    10/24/22 Call placed to patient, no answer. 10/31/22  Pt was seen at [facility] ER 10/29/22  Diagnosis Comment   RUQ pain    Hypoglycemia    GI - attended appt 10/19/22 follow-up TBD  PCP - TBD     10/7/22  Pt was seen at [facility] ER 11/4/22  Diagnosis: RUQ pain  PCP - 11/15/22  GI - Arjun, but CM will call for an appt ASAP    11/8/22  Pt was seen at [facility] ER 11/8/22  Diagnosis: Irritable bowel syndrome with constipation  PCP - 11/15/22  GI  11/16/22 11/17/22  PCP - attended appt 11/15/22   GI - attended appt 11/16/22 follow-up after upper GI  Upper GI - 12/5/22 11/18/22  Pt was seen at [facility] ER 11/17/22  Diagnosis: COVID positive  Call placed to patient, no answer. Voicemail left to return call. 12/6/22  PCP - TBD  GI -  UGI completed 12/5/22 12/16/22 Call placed to patient, no answer. Voicemail left to return call. Pt was seen at [facility] ER 12/15/22  Diagnosis: N/V, abdominal pain     12/20/22 Call placed to patient, no answer. # not in service. Kiwiple message sent to pt. Knowledge and adherence to medication plan. Taking prescribed meds    11/17/22 still hasn't resume Cymbalta. Need script sent to [address] ED Utilization       Pt has been seen 6 times this year in the ER        Supportive resources in place to maintain patient in the community (ie., home health, equipment, DME, refer to, etc.)       [facility] - # [phone] 24/7  Nurse Access line 24/7  If you have COVID-19 symptoms, have tested positive for COVID-19 or have been exposed to someone with COVID-19, call the Nurse Access Line at [phone] and select option 8. Be Well  [address]EXANCE MD [facility] 97 Urgent [facility] [phone]  HR Service Now - [facility] Workday  IT - [phone]  [employer] - 1- [phone]  Leave of absence from work (other than jury duty and bereavement), call [phone] option 1 or call the dedicated line at 027[facility] ([phone]). [employer] agents are available weekdays 8:30 a.m. - 10:30 p.m. ET. You also may: [place] for website access at [facility]. Download the [employer] mobile cathryn on or after Jan. 1 for on-the-go access to your team reports. Submit information by fax to [phone]. [employer] - [phone] Go to [employer] on the Internet or your mobile device and enter the password [email or web address] to access resources, educational information, and self-service options.

## 2023-01-04 ENCOUNTER — PATIENT OUTREACH (OUTPATIENT)
Dept: OTHER | Age: 23
End: 2023-01-04

## 2023-01-04 RX ORDER — ONDANSETRON HYDROCHLORIDE 8 MG/1
8 TABLET, FILM COATED ORAL
COMMUNITY

## 2023-01-04 RX ORDER — FAMOTIDINE 40 MG/1
40 TABLET, FILM COATED ORAL DAILY
COMMUNITY

## 2023-01-04 NOTE — PROGRESS NOTES
[facility]PRP Outreach     Call placed to patient, Verified  and address for HIPAA security. Purpose of TC     Pt was seen at [facility] ER 12/15/22  Diagnosis: N/V, abdominal pain      Pt seen at [facility] ER 22  Diagnosis: COVID positive     Pt was seen at [facility] ER 22  Diagnosis: Irritable bowel syndrome with constipation     Pt was seen at [facility] ER 22  Diagnosis: RUQ pain     Pt was seen at [facility] ER 10/29/22     Diagnosis Comment   RUQ pain     Hypoglycemia        Pt was seen at [facility] ER 10/16/22     Diagnosis Comment   Abdominal pain, epigastric     Constipation, unspecified constipation type           TC details     GI - attended appt today and was scheduled for a repeat UGI on 23. Meds were also changed. Abdominal pain and N/V continues intermittently with and without eating. Abdominal distention present, but denies issues with constipation. Denies NSAID usage. Pt reported s/s started before starting Depo shot a year ago. Migraines - pt reported chronic issues with migraines that, \"last for days. \" Pt reported h/o concussions. \" Pt will follow-up with PCP. Plan of action  Provide support to patient. CM will follow-up in 2 weeks      Goals         Care Coordination related to abdominal pain (pt-stated)       GI - attended appt 23, meds changed and scheduled for repeat UGI - 23         Establish PCP relationships and regularly scheduled appointments. PCP - 10/24/22  GI - 10/19/22    10/24/22 Call placed to patient, no answer.       10/31/22  Pt was seen at [facility] ER 10/29/22  Diagnosis Comment   RUQ pain    Hypoglycemia    GI - attended appt 10/19/22 follow-up TBD  PCP - TBD     10/7/22  Pt was seen at [facility] ER 22  Diagnosis: RUQ pain  PCP - 11/15/22  GI - Arjun, but CM will call for an appt ASAP    22  Pt was seen at [facility] ER 22  Diagnosis: Irritable bowel syndrome with constipation  PCP - 11/15/22  GI  22  PCP - attended appt 11/15/22   GI - attended appt 11/16/22 follow-up after upper GI  Upper GI - 12/5/22 11/18/22  Pt was seen at [facility] ER 11/17/22  Diagnosis: COVID positive  Call placed to patient, no answer. Voicemail left to return call. 12/6/22  PCP - TBD  GI -  UGI completed 12/5/22 12/16/22 Call placed to patient, no answer. Voicemail left to return call. Pt was seen at [facility] ER 12/15/22  Diagnosis: N/V, abdominal pain     12/20/22 Call placed to patient, no answer. # not in service. [employer] message sent to pt. 1/4/23  PCP - TBD  GI - attended appt 1/4/23 follow-up TBD  UGI - 2/24/23            Knowledge and adherence to medication plan. Taking prescribed meds    11/17/22 still hasn't resume Cymbalta. Need script sent to [address] ED Utilization       Pt has been seen 6 times this year in the ER        Supportive resources in place to maintain patient in the community (ie., home health, equipment, DME, refer to, etc.)       [facility] - # [phone] 24/7  Nurse Access line 24/7  If you have COVID-19 symptoms, have tested positive for COVID-19 or have been exposed to someone with COVID-19, call the Nurse Access Line at [phone] and select option 8. Be Well  [address] [employer] [facility] 97 Urgent [facility] [phone]  HR Service Now - [facility] Workday  IT - [phone]  [facility] Help - 1- [phone]  Leave of absence from work (other than jury duty and bereavement), call [phone] option 1 or call the dedicated line at [phone] ([phone]). [employer] agents are available weekdays 8:30 a.m. - 10:30 p.m. ET. You also may: [place] for website access at [facility]. Download the [employer] mobile cathryn on or after Jan. 1 for on-the-go access to your team reports. Submit information by fax to [phone]. [employer] - [phone] Go to [employer] on the Internet or your mobile device and enter the password [email or web address] to access resources, educational information, and self-service options.

## 2023-01-27 ENCOUNTER — PATIENT OUTREACH (OUTPATIENT)
Dept: OTHER | Age: 23
End: 2023-01-27

## 2023-01-27 NOTE — PROGRESS NOTES
HPRP Outreach     Call placed to patient, no answer. # not in service. Purpose of TC     Pt was seen at [facility] ER 12/15/22  Diagnosis: N/V, abdominal pain      Pt seen at [facility] ER 11/17/22  Diagnosis: COVID positive     Pt was seen at [facility] ER 11/8/22  Diagnosis: Irritable bowel syndrome with constipation     Pt was seen at [facility] ER 11/4/22  Diagnosis: RUQ pain     Pt was seen at [facility] ER 10/29/22     Diagnosis Comment   RUQ pain     Hypoglycemia        Pt was seen at [facility] ER 10/16/22     Diagnosis Comment   Abdominal pain, epigastric     Constipation, unspecified constipation type           TC details     Call placed to patient, no answer. # not in service. Plan of action  Provide support to patient. CM will follow-up in 2 weeks      Goals         Care Coordination related to abdominal pain (pt-stated)       GI - attended appt 1/4/23, meds changed and scheduled for repeat UGI - 2/24/23         Establish PCP relationships and regularly scheduled appointments. PCP - 10/24/22  GI - 10/19/22    10/24/22 Call placed to patient, no answer. 10/31/22  Pt was seen at [facility] ER 10/29/22  Diagnosis Comment   RUQ pain    Hypoglycemia    GI - attended appt 10/19/22 follow-up TBD  PCP - TBD     10/7/22  Pt was seen at [facility] ER 11/4/22  Diagnosis: RUQ pain  PCP - 11/15/22  GI - Arjun, but CM will call for an appt ASAP    11/8/22  Pt was seen at [facility] ER 11/8/22  Diagnosis: Irritable bowel syndrome with constipation  PCP - 11/15/22  GI  11/16/22 11/17/22  PCP - attended appt 11/15/22   GI - attended appt 11/16/22 follow-up after upper GI  Upper GI - 12/5/22 11/18/22  Pt was seen at [facility] ER 11/17/22  Diagnosis: COVID positive  Call placed to patient, no answer. Voicemail left to return call. 12/6/22  PCP - TBD  GI -  UGI completed 12/5/22 12/16/22 Call placed to patient, no answer. Voicemail left to return call.    Pt was seen at [facility] ER 12/15/22  Diagnosis: N/V, abdominal pain     12/20/22 Call placed to patient, no answer. # not in service. [employer] message sent to pt. 1/4/23  PCP - TBD  GI - attended appt 1/4/23 follow-up TBD  UGI - 2/24/23 1/27/23 Call placed to patient, no answer. # not in service. Knowledge and adherence to medication plan. Taking prescribed meds    11/17/22 still hasn't resume Cymbalta. Need script sent to [address] ED Utilization       Pt has been seen 6 times this year in the ER        Supportive resources in place to maintain patient in the community (ie., home health, equipment, DME, refer to, etc.)       [facility] - # [phone] 24/7  Nurse Access line 24/7  If you have COVID-19 symptoms, have tested positive for COVID-19 or have been exposed to someone with COVID-19, call the Nurse Access Line at [phone] and select option 8. Be Well  [address] [facility] 97 Urgent [facility] [phone]  HR Service Now - [facility] Workday  IT - [phone]  [employer] Help - 1- [phone]  Leave of absence from work (other than jury duty and bereavement), call [phone] option 1 or call the dedicated line at [phone] ([phone]). [employer] agents are available weekdays 8:30 a.m. - 10:30 p.m. ET. You also may: [place] for website access at [facility]. Download the [employer] mobile cathryn on or after Jan. 1 for on-the-go access to your team reports. Submit information by fax to [phone]. [employer] - [phone] Go to [employer] on the Internet or your mobile device and enter the password [email or web address] to access resources, educational information, and self-service options.

## 2023-02-15 ENCOUNTER — PATIENT OUTREACH (OUTPATIENT)
Dept: OTHER | Age: 23
End: 2023-02-15

## 2023-02-15 NOTE — PROGRESS NOTES
Does not qualify for Case Management through [facility] due to non-[employer] coverage. Pt no longer has [employer] nor employed with [facility] [employer]. Resolving episode.

## 2023-05-16 ENCOUNTER — NURSE TRIAGE (OUTPATIENT)
Dept: OTHER | Facility: CLINIC | Age: 23
End: 2023-05-16

## 2023-05-16 NOTE — TELEPHONE ENCOUNTER
[place]    Received call from Faisal at [facility] with [employer]. Subjective: Caller states \"since October I have been having stomach issue. I have been seen for this and been to [facility]. I have a GI doctor as well; they have not been able to tell me what was going on. On Friday I ate pizza and it felt like it was getting stuck in my throat. I thought I was having trouble breathing. I did end up throwing up. I went to [facility] and they told me they think I have GB issues. \"     No triage needed. No new/worse symptoms. Needs a follow up appointment.

## 2023-05-23 ENCOUNTER — OFFICE VISIT (OUTPATIENT)
Facility: CLINIC | Age: 23
End: 2023-05-23
Payer: COMMERCIAL

## 2023-05-23 VITALS
RESPIRATION RATE: 20 BRPM | TEMPERATURE: 98.4 F | HEIGHT: 67 IN | BODY MASS INDEX: 22.44 KG/M2 | OXYGEN SATURATION: 98 % | WEIGHT: 143 LBS | HEART RATE: 68 BPM | DIASTOLIC BLOOD PRESSURE: 78 MMHG | SYSTOLIC BLOOD PRESSURE: 113 MMHG

## 2023-05-23 DIAGNOSIS — R10.10 UPPER ABDOMINAL PAIN, UNSPECIFIED: Primary | ICD-10-CM

## 2023-05-23 DIAGNOSIS — F32.A DEPRESSION, UNSPECIFIED DEPRESSION TYPE: ICD-10-CM

## 2023-05-23 PROCEDURE — 99213 OFFICE O/P EST LOW 20 MIN: CPT | Performed by: NURSE PRACTITIONER

## 2023-05-23 RX ORDER — CIMETIDINE 400 MG/1
400 TABLET, FILM COATED ORAL 2 TIMES DAILY
COMMUNITY
Start: 2023-05-13 | End: 2023-05-23 | Stop reason: SDUPTHER

## 2023-05-23 RX ORDER — CIMETIDINE 400 MG/1
400 TABLET, FILM COATED ORAL 2 TIMES DAILY
Qty: 60 TABLET | Refills: 0 | Status: SHIPPED | OUTPATIENT
Start: 2023-05-23 | End: 2023-07-22

## 2023-05-23 RX ORDER — BUTALBITAL, ACETAMINOPHEN AND CAFFEINE 300; 40; 50 MG/1; MG/1; MG/1
1 CAPSULE ORAL EVERY 4 HOURS PRN
Qty: 10 CAPSULE | Refills: 0 | Status: SHIPPED | OUTPATIENT
Start: 2023-05-23

## 2023-05-23 RX ORDER — ONDANSETRON 4 MG/1
4 TABLET, ORALLY DISINTEGRATING ORAL EVERY 8 HOURS PRN
Qty: 180 TABLET | Refills: 0 | Status: SHIPPED | OUTPATIENT
Start: 2023-05-23 | End: 2023-07-22

## 2023-05-23 ASSESSMENT — PATIENT HEALTH QUESTIONNAIRE - PHQ9
3. TROUBLE FALLING OR STAYING ASLEEP: 2
SUM OF ALL RESPONSES TO PHQ QUESTIONS 1-9: 15
5. POOR APPETITE OR OVEREATING: 2
SUM OF ALL RESPONSES TO PHQ QUESTIONS 1-9: 15
9. THOUGHTS THAT YOU WOULD BE BETTER OFF DEAD, OR OF HURTING YOURSELF: 0
4. FEELING TIRED OR HAVING LITTLE ENERGY: 3
7. TROUBLE CONCENTRATING ON THINGS, SUCH AS READING THE NEWSPAPER OR WATCHING TELEVISION: 2
SUM OF ALL RESPONSES TO PHQ QUESTIONS 1-9: 15
2. FEELING DOWN, DEPRESSED OR HOPELESS: 3
1. LITTLE INTEREST OR PLEASURE IN DOING THINGS: 3
SUM OF ALL RESPONSES TO PHQ QUESTIONS 1-9: 15
SUM OF ALL RESPONSES TO PHQ9 QUESTIONS 1 & 2: 6
10. IF YOU CHECKED OFF ANY PROBLEMS, HOW DIFFICULT HAVE THESE PROBLEMS MADE IT FOR YOU TO DO YOUR WORK, TAKE CARE OF THINGS AT HOME, OR GET ALONG WITH OTHER PEOPLE: 1
6. FEELING BAD ABOUT YOURSELF - OR THAT YOU ARE A FAILURE OR HAVE LET YOURSELF OR YOUR FAMILY DOWN: 0
8. MOVING OR SPEAKING SO SLOWLY THAT OTHER PEOPLE COULD HAVE NOTICED. OR THE OPPOSITE, BEING SO FIGETY OR RESTLESS THAT YOU HAVE BEEN MOVING AROUND A LOT MORE THAN USUAL: 0

## 2023-05-23 NOTE — PROGRESS NOTES
Chief Complaint   Patient presents with    Gastroesophageal Reflux     Follow up 3 months     Depression

## 2023-05-23 NOTE — PATIENT INSTRUCTIONS
It was a pleasure to see you. Tagamet refilled. Zofran refilled. Fioricet prescribed for migraines.      MIKAELA Arrington - NP

## 2023-05-27 ASSESSMENT — ENCOUNTER SYMPTOMS
NAUSEA: 1
VOMITING: 1
ALLERGIC/IMMUNOLOGIC NEGATIVE: 1
RESPIRATORY NEGATIVE: 1
EYES NEGATIVE: 1
ABDOMINAL PAIN: 1

## 2023-05-27 NOTE — PROGRESS NOTES
Shaun Torres (: 2000) is a 26 y.o. female is here for evaluation of the following chief complaint(s): Gastroesophageal Reflux (Follow up 3 months ) and Depression        Subjective/Objective:   Lorrie Chamberlain was seen today for Gastroesophageal Reflux (Follow up 3 months ) and Depression  Pt complaint of GI reflux and nausea. Tagamet refilled along with zofran. Pt has occasional migraines. Pt denies any FAST symptoms. Fioricet prescribed. Pt has had multiple scans and ER visits for upper abd pain and has follow up appt with GI. Pt depression not worse and somewhat improved. Review of Systems   Constitutional: Negative. HENT: Negative. Eyes: Negative. Respiratory: Negative. Cardiovascular: Negative. Gastrointestinal:  Positive for abdominal pain, nausea and vomiting. Endocrine: Negative. Genitourinary: Negative. Musculoskeletal: Negative. Skin: Negative. Allergic/Immunologic: Negative. Neurological: Negative. Hematological: Negative. Psychiatric/Behavioral:  Positive for dysphoric mood. The patient is nervous/anxious. Physical Exam  Vitals reviewed. Constitutional:       General: She is not in acute distress. Appearance: Normal appearance. She is not ill-appearing. HENT:      Head: Normocephalic and atraumatic. Right Ear: External ear normal.      Left Ear: External ear normal.      Nose: Nose normal.      Mouth/Throat:      Mouth: Mucous membranes are moist.   Eyes:      Conjunctiva/sclera: Conjunctivae normal.   Cardiovascular:      Rate and Rhythm: Normal rate and regular rhythm. Pulses: Normal pulses. Heart sounds: Normal heart sounds. Pulmonary:      Effort: Pulmonary effort is normal.      Breath sounds: Normal breath sounds. Abdominal:      General: Abdomen is flat. Bowel sounds are normal. There is no distension. Palpations: Abdomen is soft. Tenderness:  There is abdominal tenderness in the right upper quadrant and

## 2023-06-09 ENCOUNTER — OFFICE VISIT (OUTPATIENT)
Facility: CLINIC | Age: 23
End: 2023-06-09
Payer: COMMERCIAL

## 2023-06-09 VITALS
HEIGHT: 67 IN | TEMPERATURE: 98.9 F | DIASTOLIC BLOOD PRESSURE: 70 MMHG | WEIGHT: 142 LBS | HEART RATE: 84 BPM | SYSTOLIC BLOOD PRESSURE: 116 MMHG | RESPIRATION RATE: 20 BRPM | OXYGEN SATURATION: 98 % | BODY MASS INDEX: 22.29 KG/M2

## 2023-06-09 DIAGNOSIS — F32.A DEPRESSION, UNSPECIFIED DEPRESSION TYPE: Primary | ICD-10-CM

## 2023-06-09 PROCEDURE — 99214 OFFICE O/P EST MOD 30 MIN: CPT | Performed by: NURSE PRACTITIONER

## 2023-06-09 RX ORDER — ESCITALOPRAM OXALATE 10 MG/1
10 TABLET ORAL DAILY
Qty: 90 TABLET | Refills: 1 | Status: SHIPPED | OUTPATIENT
Start: 2023-06-09

## 2023-06-09 RX ORDER — CITALOPRAM 20 MG/1
20 TABLET ORAL DAILY
Qty: 90 TABLET | Refills: 1 | Status: SHIPPED | OUTPATIENT
Start: 2023-06-09 | End: 2023-06-09 | Stop reason: CLARIF

## 2023-06-09 ASSESSMENT — PATIENT HEALTH QUESTIONNAIRE - PHQ9
6. FEELING BAD ABOUT YOURSELF - OR THAT YOU ARE A FAILURE OR HAVE LET YOURSELF OR YOUR FAMILY DOWN: 3
1. LITTLE INTEREST OR PLEASURE IN DOING THINGS: 3
SUM OF ALL RESPONSES TO PHQ QUESTIONS 1-9: 21
SUM OF ALL RESPONSES TO PHQ QUESTIONS 1-9: 21
9. THOUGHTS THAT YOU WOULD BE BETTER OFF DEAD, OR OF HURTING YOURSELF: 0
8. MOVING OR SPEAKING SO SLOWLY THAT OTHER PEOPLE COULD HAVE NOTICED. OR THE OPPOSITE, BEING SO FIGETY OR RESTLESS THAT YOU HAVE BEEN MOVING AROUND A LOT MORE THAN USUAL: 1
10. IF YOU CHECKED OFF ANY PROBLEMS, HOW DIFFICULT HAVE THESE PROBLEMS MADE IT FOR YOU TO DO YOUR WORK, TAKE CARE OF THINGS AT HOME, OR GET ALONG WITH OTHER PEOPLE: 2
SUM OF ALL RESPONSES TO PHQ9 QUESTIONS 1 & 2: 6
7. TROUBLE CONCENTRATING ON THINGS, SUCH AS READING THE NEWSPAPER OR WATCHING TELEVISION: 3
5. POOR APPETITE OR OVEREATING: 2
4. FEELING TIRED OR HAVING LITTLE ENERGY: 3
2. FEELING DOWN, DEPRESSED OR HOPELESS: 3
SUM OF ALL RESPONSES TO PHQ QUESTIONS 1-9: 21
3. TROUBLE FALLING OR STAYING ASLEEP: 3
SUM OF ALL RESPONSES TO PHQ QUESTIONS 1-9: 21

## 2023-06-09 ASSESSMENT — COLUMBIA-SUICIDE SEVERITY RATING SCALE - C-SSRS
6. HAVE YOU EVER DONE ANYTHING, STARTED TO DO ANYTHING, OR PREPARED TO DO ANYTHING TO END YOUR LIFE?: NO
2. HAVE YOU ACTUALLY HAD ANY THOUGHTS OF KILLING YOURSELF?: NO
1. WITHIN THE PAST MONTH, HAVE YOU WISHED YOU WERE DEAD OR WISHED YOU COULD GO TO SLEEP AND NOT WAKE UP?: NO

## 2023-06-09 ASSESSMENT — ENCOUNTER SYMPTOMS
ALLERGIC/IMMUNOLOGIC NEGATIVE: 1
GASTROINTESTINAL NEGATIVE: 1
EYES NEGATIVE: 1
RESPIRATORY NEGATIVE: 1

## 2023-06-09 NOTE — PROGRESS NOTES
Chief Complaint   Patient presents with    Depression     Since May 1, 2023 has not felt like herself - more emotional - crying more    Anxiety     Had to stop taking medication last year due to insurance - (history of escitalopram, which did help)
08/29/2022 Negative  Negative Final    Blood, Urine 08/29/2022 Negative  Negative Final    Bilirubin, Urine 08/29/2022 Negative  Negative Final    Urobilinogen, Urine 08/29/2022 1.0  0.2 - 1.0 mg/dL Final    Nitrite, Urine 08/29/2022 Negative  Negative Final    Microscopic Examination 08/29/2022 See additional order   Final    Microscopic was indicated and was performed. TSH 08/29/2022 1.640  0.450 - 4.500 uIU/mL Final    WBC, UA 08/29/2022 11-30 (A)  0 - 5 /hpf Final    RBC, UA 08/29/2022 None seen  0 - 2 /hpf Final    Epithelial Cells, UA 08/29/2022 0-10  0 - 10 /hpf Final    Casts UA 08/29/2022 None seen  None seen /lpf Final    BACTERIA, URINE 08/29/2022 Few  None seen/Few Final    INTERPRETATION, [id number] 08/29/2022 Comment   Final    Comment: Negative  Not infected with HCV, unless recent infection is suspected or other  evidence exists to indicate HCV infection. HCV AB, [id number] 08/29/2022 <0.1  0.0 - 0.9 s/co ratio Final         Past Surgical History:   Procedure Laterality Date    ORTHOPEDIC SURGERY      right ACL    TYMPANOSTOMY TUBE PLACEMENT          No past medical history on file. Current Outpatient Medications   Medication Instructions    butalbital-APAP-caffeine (FIORICET) -40 MG CAPS per capsule 1 capsule, Oral, EVERY 4 HOURS PRN    cimetidine (TAGAMET) 400 mg, Oral, 2 TIMES DAILY    escitalopram (LEXAPRO) 10 mg, Oral, DAILY    medroxyPROGESTERone (DEPO-PROVERA) 150 mg, IntraMUSCular    ondansetron (ZOFRAN) 8 mg, Oral, EVERY 8 HOURS PRN        Assessment/Plan:     The above diagnosis is a acute on chronic problem. We discussed expected course, resolution, and complications of diagnosis in detail. I advised her to call back or return to office if symptoms worsen/change/persist.       Diagnosis Orders   1. Depression, unspecified depression type           Return in about 6 weeks (around 7/21/2023). To reassess medication regimen.     An electronic signature was used to authenticate

## 2023-06-09 NOTE — PATIENT INSTRUCTIONS
It was a pleasure to see you today. Restarting you citalopram. Please My chart or call office for any side effects or worsening symptoms. Virtual visit in 6 weeks to discuss response to medication.     MIKAELA Brasher NP'

## 2023-07-21 ENCOUNTER — TELEMEDICINE (OUTPATIENT)
Facility: CLINIC | Age: 23
End: 2023-07-21
Payer: COMMERCIAL

## 2023-07-21 DIAGNOSIS — F41.9 ANXIETY: Primary | ICD-10-CM

## 2023-07-21 DIAGNOSIS — T50.905A ADVERSE EFFECT OF DRUG, INITIAL ENCOUNTER: ICD-10-CM

## 2023-07-21 PROCEDURE — 99443 PR PHYS/QHP TELEPHONE EVALUATION 21-30 MIN: CPT | Performed by: NURSE PRACTITIONER

## 2023-07-21 RX ORDER — CITALOPRAM HYDROBROMIDE 10 MG/1
10 TABLET ORAL DAILY
Qty: 45 TABLET | Refills: 0 | Status: SHIPPED | OUTPATIENT
Start: 2023-07-21 | End: 2023-09-04

## 2023-07-21 ASSESSMENT — PATIENT HEALTH QUESTIONNAIRE - PHQ9
1. LITTLE INTEREST OR PLEASURE IN DOING THINGS: 0
SUM OF ALL RESPONSES TO PHQ QUESTIONS 1-9: 0
SUM OF ALL RESPONSES TO PHQ9 QUESTIONS 1 & 2: 0
SUM OF ALL RESPONSES TO PHQ QUESTIONS 1-9: 0
2. FEELING DOWN, DEPRESSED OR HOPELESS: 0
SUM OF ALL RESPONSES TO PHQ QUESTIONS 1-9: 0
SUM OF ALL RESPONSES TO PHQ QUESTIONS 1-9: 0

## 2023-07-21 NOTE — PROGRESS NOTES
Chief Complaint   Patient presents with    Anxiety     Discuss anxiety medication - suffering with more migraines, strange dreams, no appetite , and states her anxiety is no better taking escitalopram

## 2023-07-31 NOTE — PROGRESS NOTES
Lucía Mike (:  2000) is a Established patient, presenting virtually for evaluation of the following:    Assessment & Plan   Below is the assessment and plan developed based on review of pertinent history, physical exam, labs, studies, and medications. 1. Anxiety  2. Adverse effect of drug, initial encounter    Return in about 6 weeks (around 2023). Subjective   HPI  Pt reports the Lexapro has not helped her at all and is in fact causing side effects of strange dreams. More emotional and crying. Pt discontinued the medication herself. Will prescribe Celexa and reassess her anxiety.      Review of Systems       Objective   Patient-Reported Vitals  No data recorded     Physical Exam  [INSTRUCTIONS:  \"[x]\" Indicates a positive item  \"[]\" Indicates a negative item  -- DELETE ALL ITEMS NOT EXAMINED]    Constitutional: [x] Appears well-developed and well-nourished [x] No apparent distress      [] Abnormal -     Mental status: [x] Alert and awake  [x] Oriented to person/place/time [x] Able to follow commands    [] Abnormal -     Eyes:   EOM    [x]  Normal    [] Abnormal -   Sclera  [x]  Normal    [] Abnormal -          Discharge [x]  None visible   [] Abnormal -     HENT: [x] Normocephalic, atraumatic  [] Abnormal -   [x] Mouth/Throat: Mucous membranes are moist    External Ears [x] Normal  [] Abnormal -    Neck: [x] No visualized mass [] Abnormal -     Pulmonary/Chest: [x] Respiratory effort normal   [x] No visualized signs of difficulty breathing or respiratory distress        [] Abnormal -      Musculoskeletal:   [x] Normal gait with no signs of ataxia         [x] Normal range of motion of neck        [] Abnormal -     Neurological:        [x] No Facial Asymmetry (Cranial nerve 7 motor function) (limited exam due to video visit)          [x] No gaze palsy        [] Abnormal -          Skin:        [x] No significant exanthematous lesions or discoloration noted on facial skin         [] Abnormal -

## 2023-09-26 ENCOUNTER — OFFICE VISIT (OUTPATIENT)
Facility: CLINIC | Age: 23
End: 2023-09-26
Payer: COMMERCIAL

## 2023-09-26 VITALS
HEIGHT: 67 IN | OXYGEN SATURATION: 97 % | SYSTOLIC BLOOD PRESSURE: 112 MMHG | DIASTOLIC BLOOD PRESSURE: 72 MMHG | RESPIRATION RATE: 20 BRPM | HEART RATE: 72 BPM | TEMPERATURE: 97.4 F | BODY MASS INDEX: 22.44 KG/M2 | WEIGHT: 143 LBS

## 2023-09-26 DIAGNOSIS — F41.9 ANXIETY: Primary | ICD-10-CM

## 2023-09-26 DIAGNOSIS — F32.A DEPRESSION, UNSPECIFIED DEPRESSION TYPE: ICD-10-CM

## 2023-09-26 PROCEDURE — 99214 OFFICE O/P EST MOD 30 MIN: CPT | Performed by: NURSE PRACTITIONER

## 2023-09-26 RX ORDER — FLUOXETINE 10 MG/1
10 CAPSULE ORAL DAILY
Qty: 30 CAPSULE | Refills: 3 | Status: SHIPPED | OUTPATIENT
Start: 2023-09-26

## 2023-09-26 ASSESSMENT — PATIENT HEALTH QUESTIONNAIRE - PHQ9
9. THOUGHTS THAT YOU WOULD BE BETTER OFF DEAD, OR OF HURTING YOURSELF: 0
10. IF YOU CHECKED OFF ANY PROBLEMS, HOW DIFFICULT HAVE THESE PROBLEMS MADE IT FOR YOU TO DO YOUR WORK, TAKE CARE OF THINGS AT HOME, OR GET ALONG WITH OTHER PEOPLE: 1
SUM OF ALL RESPONSES TO PHQ QUESTIONS 1-9: 12
3. TROUBLE FALLING OR STAYING ASLEEP: 2
8. MOVING OR SPEAKING SO SLOWLY THAT OTHER PEOPLE COULD HAVE NOTICED. OR THE OPPOSITE, BEING SO FIGETY OR RESTLESS THAT YOU HAVE BEEN MOVING AROUND A LOT MORE THAN USUAL: 1
SUM OF ALL RESPONSES TO PHQ9 QUESTIONS 1 & 2: 3
SUM OF ALL RESPONSES TO PHQ QUESTIONS 1-9: 12
SUM OF ALL RESPONSES TO PHQ QUESTIONS 1-9: 12
4. FEELING TIRED OR HAVING LITTLE ENERGY: 3
5. POOR APPETITE OR OVEREATING: 1
7. TROUBLE CONCENTRATING ON THINGS, SUCH AS READING THE NEWSPAPER OR WATCHING TELEVISION: 2
SUM OF ALL RESPONSES TO PHQ QUESTIONS 1-9: 12
6. FEELING BAD ABOUT YOURSELF - OR THAT YOU ARE A FAILURE OR HAVE LET YOURSELF OR YOUR FAMILY DOWN: 0
1. LITTLE INTEREST OR PLEASURE IN DOING THINGS: 2
2. FEELING DOWN, DEPRESSED OR HOPELESS: 1

## 2023-09-26 NOTE — PROGRESS NOTES
Chief Complaint   Patient presents with    Anxiety     Medication evaluation - depression is better but anxiety has not improved - has had at least 1 panic attack since her last visit at work with no trigger

## 2023-09-26 NOTE — PATIENT INSTRUCTIONS
Add prozac to celexa. Return in 6 weeks for recheck. Sooner for any side effects.     MIKAELA Gaytna - NP

## 2023-10-10 ENCOUNTER — TELEPHONE (OUTPATIENT)
Facility: CLINIC | Age: 23
End: 2023-10-10

## 2023-10-10 NOTE — TELEPHONE ENCOUNTER
Pt calling to information about getting an emotional support animal and to check on rx request. Please call pt at [phone]

## 2023-10-15 ASSESSMENT — ENCOUNTER SYMPTOMS
CONSTIPATION: 0
CHEST TIGHTNESS: 0
WHEEZING: 0
DIARRHEA: 0
COUGH: 0
ABDOMINAL PAIN: 0
NAUSEA: 0
SHORTNESS OF BREATH: 0

## 2023-10-18 RX ORDER — CIMETIDINE 400 MG/1
400 TABLET, FILM COATED ORAL 2 TIMES DAILY
Qty: 60 TABLET | Refills: 1 | Status: SHIPPED | OUTPATIENT
Start: 2023-10-18 | End: 2023-12-17

## 2023-11-07 ENCOUNTER — TELEMEDICINE (OUTPATIENT)
Facility: CLINIC | Age: 23
End: 2023-11-07
Payer: COMMERCIAL

## 2023-11-07 DIAGNOSIS — F41.9 ANXIETY: Primary | ICD-10-CM

## 2023-11-07 PROCEDURE — 99448 NTRPROF PH1/NTRNET/EHR 21-30: CPT | Performed by: NURSE PRACTITIONER

## 2023-11-07 ASSESSMENT — PATIENT HEALTH QUESTIONNAIRE - PHQ9
SUM OF ALL RESPONSES TO PHQ9 QUESTIONS 1 & 2: 0
2. FEELING DOWN, DEPRESSED OR HOPELESS: 0
SUM OF ALL RESPONSES TO PHQ QUESTIONS 1-9: 0
1. LITTLE INTEREST OR PLEASURE IN DOING THINGS: 0
SUM OF ALL RESPONSES TO PHQ QUESTIONS 1-9: 0

## 2023-11-07 NOTE — PROGRESS NOTES
Crissy Barahona, was evaluated through a synchronous (real-time) audio-video encounter. The patient (or guardian if applicable) is aware that this is a billable service, which includes applicable co-pays. This Virtual Visit was conducted with patient's (and/or legal guardian's) consent. Patient identification was verified, and a caregiver was present when appropriate. The patient was located at Home: [address]  [place] [address]  Provider was located at Facility (Appt Dept): [address]        Crissy Barahona (:  2000) is a Established patient, presenting virtually for evaluation of the following:    Assessment & Plan   Below is the assessment and plan developed based on review of pertinent history, physical exam, labs, studies, and medications. 1. Anxiety  -     Basic Metabolic Panel; Future  -     Protime-INR; Future  -     APTT; Future    Return in about 3 months (around 2024). Subjective   HPI    Pt feels much better with the Prozac and Celexa. Pt reports eating and sleeping well.  Denies any side effects especially heart palpitations chest pain sob HA dizziness n/v/d  Review of Systems       Objective   Patient-Reported Vitals  No data recorded     Physical Exam  [INSTRUCTIONS:  \"[x]\" Indicates a positive item  \"[]\" Indicates a negative item  -- DELETE ALL ITEMS NOT EXAMINED]    Constitutional: [x] Appears well-developed and well-nourished [x] No apparent distress      [] Abnormal -     Mental status: [x] Alert and awake  [x] Oriented to person/place/time [x] Able to follow commands    [] Abnormal -     Eyes:   EOM    [x]  Normal    [] Abnormal -   Sclera  [x]  Normal    [] Abnormal -          Discharge [x]  None visible   [] Abnormal -     HENT: [x] Normocephalic, atraumatic  [] Abnormal -   [x] Mouth/Throat: Mucous membranes are moist    External Ears [x] Normal  [] Abnormal -    Neck: [x] No visualized mass [] Abnormal -

## 2023-11-07 NOTE — PATIENT INSTRUCTIONS
Return in Feb for yearly labs. Please have new labs drawn at [email or web address] in next week or 2. Call office for any side effects eg.  Chest pain nausea vomiting diarrhea palpitations MIKAELA Marcus - NP Normal for race

## 2023-11-07 NOTE — PROGRESS NOTES
Chief Complaint   Patient presents with    Anxiety     Follow up after adding Prozac to Celexa medication regimen

## 2023-11-21 RX ORDER — CITALOPRAM HYDROBROMIDE 10 MG/1
10 TABLET ORAL DAILY
Qty: 45 TABLET | Refills: 0 | Status: SHIPPED | OUTPATIENT
Start: 2023-11-21 | End: 2024-01-05

## 2024-01-09 ENCOUNTER — PATIENT MESSAGE (OUTPATIENT)
Facility: CLINIC | Age: 24
End: 2024-01-09

## 2024-02-05 ENCOUNTER — TELEPHONE (OUTPATIENT)
Facility: CLINIC | Age: 24
End: 2024-02-05

## 2024-02-05 NOTE — TELEPHONE ENCOUNTER
Pt calling about med she requested on 1/9 through [employer] and was sent to nani on 1/10. This has not been responded to and she needs to know about her meds.     Pt asked to have a nurse call her tomorrow before 12:30 because she is in class the rest of the day today. She can be reached at [phone]

## 2024-02-06 RX ORDER — FLUOXETINE HYDROCHLORIDE 20 MG/1
20 CAPSULE ORAL DAILY
Qty: 30 CAPSULE | Refills: 3 | Status: SHIPPED | OUTPATIENT
Start: 2024-02-06

## 2024-07-31 ENCOUNTER — TELEPHONE (OUTPATIENT)
Facility: CLINIC | Age: 24
End: 2024-07-31

## 2024-07-31 NOTE — TELEPHONE ENCOUNTER
Care Transitions Initial Follow Up Call    Outreach made within 2 business days of discharge: Yes    Patient: Robyn Escobedo Patient : 2000   MRN: [id number]  Reason for Admission: left side weakness / headache  Discharge Date: 2024       Spoke with: patient    [facility] Patient Contact:  Was patient able to fill all prescriptions: Yes  Was patient instructed to bring all medications to the follow-up visit: Yes  Is patient taking all medications as directed in the discharge summary? Yes  Does patient understand their discharge instructions: Yes  Does patient have questions or concerns that need addressed prior to 7-14 day follow up office visit: no    Additional needs identified to be addressed with provider  No needs identified             Scheduled appointment with PCP within 7-14 days    Follow Up  Future Appointments   Date Time Provider Department Center   2024  3:30 PM Rosio Tovar APRN - NP [facility] Moreno LPN

## 2024-08-13 ENCOUNTER — OFFICE VISIT (OUTPATIENT)
Facility: CLINIC | Age: 24
End: 2024-08-13

## 2024-08-13 VITALS
RESPIRATION RATE: 18 BRPM | SYSTOLIC BLOOD PRESSURE: 113 MMHG | BODY MASS INDEX: 24.48 KG/M2 | WEIGHT: 156 LBS | TEMPERATURE: 98.1 F | HEART RATE: 103 BPM | DIASTOLIC BLOOD PRESSURE: 72 MMHG | HEIGHT: 67 IN | OXYGEN SATURATION: 95 %

## 2024-08-13 DIAGNOSIS — Z09 HOSPITAL DISCHARGE FOLLOW-UP: Primary | ICD-10-CM

## 2024-08-13 RX ORDER — VALACYCLOVIR HYDROCHLORIDE 500 MG/1
1000 TABLET, FILM COATED ORAL DAILY
COMMUNITY
Start: 2023-08-02

## 2024-08-13 RX ORDER — AMITRIPTYLINE HYDROCHLORIDE 25 MG/1
25 TABLET, FILM COATED ORAL DAILY
COMMUNITY
Start: 2024-07-27 | End: 2024-08-16

## 2024-08-13 ASSESSMENT — ENCOUNTER SYMPTOMS
COUGH: 0
CHEST TIGHTNESS: 0
WHEEZING: 0
ABDOMINAL PAIN: 0
NAUSEA: 0
SHORTNESS OF BREATH: 0
CONSTIPATION: 0
DIARRHEA: 0

## 2024-08-13 NOTE — PROGRESS NOTES
Robyn Escobedo (:  2000) is a 24 y.o. female who presents to the office today for the following:  Follow-Up from Hospital      Assessment & Plan   ASSESSMENT/PLAN:  1. Hospital discharge follow-up  -     PA DISCHARGE MEDS RECONCILED W/ CURRENT OUTPATIENT MED LIST      No results found for any visits on 24.     Return in 1 month (on 2024).         Subjective   SUBJECTIVE/OBJECTIVE:  HPI  Pt presents to the clinic for follow up after hospital admission. PT was seen 2024 in the ER and again 2024 but was admitted and discharged 2024. Pt was admitted for L sided dysarthria. MRI was neg for stroke. Pt continuing with out patient PT and OT. Pt reports having a follow up appt with neuro 2024. Pt is going back to work tomorrow which makes her nervous. Pt reports sleeping very well, \"almost too much\". Pt reports eating and drinking well. Pt noticed if she drinks caffeine her symptoms are worse. Pt has residual left sided weakness. Pt hx of 2 concussions. Pt scheduled to go back to school but on line Aug 20th.Pt reports trying to get an appt with psych as well.   Allergies   Allergen Reactions    Penicillins Rash     Current Outpatient Medications   Medication Sig Dispense Refill    valACYclovir (VALTREX) 500 MG tablet Take 2 tablets by mouth daily      amitriptyline (ELAVIL) 25 MG tablet Take 1 tablet by mouth daily       No current facility-administered medications for this visit.        Review of Systems   Constitutional:  Negative for activity change, fatigue, fever and unexpected weight change.   HENT:  Negative for nosebleeds.    Eyes:  Negative for visual disturbance.   Respiratory:  Negative for cough, chest tightness, shortness of breath and wheezing.    Cardiovascular:  Negative for chest pain, palpitations and leg swelling.   Gastrointestinal:  Negative for abdominal pain, constipation, diarrhea and nausea.   Endocrine: Negative for polydipsia and polyuria.   Genitourinary:

## 2024-08-13 NOTE — PROGRESS NOTES
Rm    Chief Complaint   Patient presents with    Follow-Up from Hospital        /72 (Site: Left Upper Arm)   Pulse (!) 103   Temp 98.1 °F (36.7 °C)   Resp 18   Ht 1.702 m (5' 7\")   Wt 70.8 kg (156 lb)   SpO2 95%   BMI 24.43 kg/m²      1. Have you been to the ER, urgent care clinic since your last visit?  Hospitalized since your last visit? Yes [facility] 7/24/24 and 7/29/24 Head injury.  Stroke like symptoms.    2. Have you seen or consulted any other health care providers outside of the [facility] since your last visit?  Include any pap smears or colon screening. No     Health Maintenance Due   Topic Date Due    HIV screen  Never done    Chlamydia/GC screen  Never done    Pap smear  Never done    COVID-19 Vaccine (5 - 2023-24 season) 09/01/2023    Flu vaccine (1) 08/01/2024             No data to display                 Failed to redirect to the Timeline version of the [employer] SmartLink.    Failed to redirect to the Timeline version of the [employer] SmartLink.

## 2024-08-16 ENCOUNTER — TELEMEDICINE (OUTPATIENT)
Facility: CLINIC | Age: 24
End: 2024-08-16
Payer: COMMERCIAL

## 2024-08-16 DIAGNOSIS — G43.919 INTRACTABLE MIGRAINE WITHOUT STATUS MIGRAINOSUS, UNSPECIFIED MIGRAINE TYPE: Primary | ICD-10-CM

## 2024-08-16 DIAGNOSIS — F41.9 ANXIETY DISORDER, UNSPECIFIED TYPE: ICD-10-CM

## 2024-08-16 PROCEDURE — 99447 NTRPROF PH1/NTRNET/EHR 11-20: CPT | Performed by: NURSE PRACTITIONER

## 2024-08-16 RX ORDER — PROPRANOLOL HCL 60 MG
60 CAPSULE, EXTENDED RELEASE 24HR ORAL DAILY
Qty: 30 CAPSULE | Refills: 0 | Status: SHIPPED | OUTPATIENT
Start: 2024-08-16 | End: 2024-09-15

## 2024-08-16 ASSESSMENT — PATIENT HEALTH QUESTIONNAIRE - PHQ9
SUM OF ALL RESPONSES TO PHQ QUESTIONS 1-9: 2
1. LITTLE INTEREST OR PLEASURE IN DOING THINGS: SEVERAL DAYS
SUM OF ALL RESPONSES TO PHQ QUESTIONS 1-9: 2
2. FEELING DOWN, DEPRESSED OR HOPELESS: SEVERAL DAYS
SUM OF ALL RESPONSES TO PHQ9 QUESTIONS 1 & 2: 2

## 2024-08-16 NOTE — PROGRESS NOTES
Robyn Escobedo, was evaluated through a synchronous (real-time) audio-video encounter. The patient (or guardian if applicable) is aware that this is a billable service, which includes applicable co-pays. This Virtual Visit was conducted with patient's (and/or legal guardian's) consent. Patient identification was verified, and a caregiver was present when appropriate.   The patient was located at Home: [address]  [place] [address]  Provider was located at Facility (Appt Dept): [address] [place] [address] [address]  [place] [address]  Confirm you are appropriately licensed, registered, or certified to deliver care in the state where the patient is located as indicated above. If you are not or unsure, please re-schedule the visit: Yes, I confirm.     Robyn Escobedo (:  2000) is a Established patient, presenting virtually for evaluation of the following:      Below is the assessment and plan developed based on review of pertinent history, physical exam, labs, studies, and medications.     Assessment & Plan  Anxiety disorder, unspecified type            Intractable migraine without status migrainosus, unspecified migraine type   Pt states the amitriptyline is not helping. Changed medication to propanolol           Return if symptoms worsen or fail to improve.       Subjective   HPI    Pt presents for a VV. CC is left side \"gave out\" yesterday. Pt  was in the hospital for same and seen by me 2024 post discharge follow up. Pt having daily migraines. MRI head and MRA neck neg and seen by neurosurgery in hospital. Pt has appt with neurology 2024. Pt medication changed from amitriptyline to propanolol for HA. Pt states did have some palpitations.   Review of Systems       Objective   Patient-Reported Vitals  No data recorded     Physical Exam         On this date 2024 I have spent 40 minutes reviewing previous notes, test results and face to face (virtual) with the patient discussing

## 2024-08-16 NOTE — PATIENT INSTRUCTIONS
It was a pleasure to see you today.    1. Intractable migraine without status migrainosus, unspecified migraine type    2. Anxiety disorder, unspecified type         No follow-ups on file.     Thank you for choosing [facility].    MIKAELA Ackerman - NP

## 2024-09-13 ENCOUNTER — OFFICE VISIT (OUTPATIENT)
Facility: CLINIC | Age: 24
End: 2024-09-13
Payer: COMMERCIAL

## 2024-09-13 VITALS
HEART RATE: 80 BPM | BODY MASS INDEX: 23.23 KG/M2 | HEIGHT: 67 IN | WEIGHT: 148 LBS | DIASTOLIC BLOOD PRESSURE: 68 MMHG | RESPIRATION RATE: 16 BRPM | SYSTOLIC BLOOD PRESSURE: 104 MMHG | TEMPERATURE: 97.4 F | OXYGEN SATURATION: 97 %

## 2024-09-13 DIAGNOSIS — G89.29 CHRONIC INTRACTABLE HEADACHE, UNSPECIFIED HEADACHE TYPE: Primary | ICD-10-CM

## 2024-09-13 DIAGNOSIS — R51.9 CHRONIC INTRACTABLE HEADACHE, UNSPECIFIED HEADACHE TYPE: Primary | ICD-10-CM

## 2024-09-13 DIAGNOSIS — F32.A DEPRESSION, UNSPECIFIED DEPRESSION TYPE: ICD-10-CM

## 2024-09-13 DIAGNOSIS — F41.9 ANXIETY: ICD-10-CM

## 2024-09-13 PROCEDURE — 99214 OFFICE O/P EST MOD 30 MIN: CPT | Performed by: NURSE PRACTITIONER

## 2024-09-13 RX ORDER — SERTRALINE HYDROCHLORIDE 25 MG/1
25 TABLET, FILM COATED ORAL DAILY
Qty: 30 TABLET | Refills: 3 | Status: SHIPPED | OUTPATIENT
Start: 2024-09-13

## 2024-09-13 RX ORDER — SUMATRIPTAN 25 MG/1
25 TABLET, FILM COATED ORAL 4 TIMES DAILY PRN
Qty: 30 TABLET | Refills: 0 | Status: SHIPPED | OUTPATIENT
Start: 2024-09-13 | End: 2024-10-13

## 2024-09-13 SDOH — ECONOMIC STABILITY: INCOME INSECURITY: HOW HARD IS IT FOR YOU TO PAY FOR THE VERY BASICS LIKE FOOD, HOUSING, MEDICAL CARE, AND HEATING?: NOT VERY HARD

## 2024-09-13 SDOH — ECONOMIC STABILITY: FOOD INSECURITY: WITHIN THE PAST 12 MONTHS, THE FOOD YOU BOUGHT JUST DIDN'T LAST AND YOU DIDN'T HAVE MONEY TO GET MORE.: NEVER TRUE

## 2024-09-13 SDOH — ECONOMIC STABILITY: FOOD INSECURITY: WITHIN THE PAST 12 MONTHS, YOU WORRIED THAT YOUR FOOD WOULD RUN OUT BEFORE YOU GOT MONEY TO BUY MORE.: NEVER TRUE

## 2024-09-13 SDOH — ECONOMIC STABILITY: TRANSPORTATION INSECURITY
IN THE PAST 12 MONTHS, HAS LACK OF TRANSPORTATION KEPT YOU FROM MEETINGS, WORK, OR FROM GETTING THINGS NEEDED FOR DAILY LIVING?: NO

## 2024-09-25 ASSESSMENT — ENCOUNTER SYMPTOMS
SINUS PAIN: 0
EYE PAIN: 0
PHOTOPHOBIA: 0
NAUSEA: 0
VOMITING: 0

## 2024-10-03 ENCOUNTER — TELEPHONE (OUTPATIENT)
Facility: CLINIC | Age: 24
End: 2024-10-03

## 2024-10-03 NOTE — TELEPHONE ENCOUNTER
Alfredo from [facility] calling to get order that was faxed on 9/26/24 refaxed because of a \"discrepancy\". He is faxing a new order. Please refax to [phone] or [phone] and email to [email or web address].     May want to call pt to verify company, since there have been a lot of scams that seem to be targeting our patients

## 2024-10-08 NOTE — TELEPHONE ENCOUNTER
Refaxed new paperwork signed to [phone] - confirmation of receipt received by fax  Tamiko Moreno LPN 10/8/2024 10:11 AM

## 2024-10-15 ENCOUNTER — TELEPHONE (OUTPATIENT)
Facility: CLINIC | Age: 24
End: 2024-10-15

## 2024-10-15 NOTE — TELEPHONE ENCOUNTER
[facility] calling to clarify orders for Home Health for the above patient. States he spoke with someone on 10/3/24, and faxed over the request, but has not heard back since. The order number is [id number]. Please call back to clarify what Rosio wants the patient to have.

## 2024-10-18 ENCOUNTER — TELEPHONE (OUTPATIENT)
Facility: CLINIC | Age: 24
End: 2024-10-18

## 2025-03-21 ENCOUNTER — HOSPITAL ENCOUNTER (EMERGENCY)
Facility: HOSPITAL | Age: 25
Discharge: HOME | End: 2025-03-21
Attending: EMERGENCY MEDICINE
Payer: COMMERCIAL

## 2025-03-21 ENCOUNTER — APPOINTMENT (OUTPATIENT)
Facility: HOSPITAL | Age: 25
End: 2025-03-21
Payer: COMMERCIAL

## 2025-03-21 VITALS
HEIGHT: 67 IN | HEART RATE: 85 BPM | OXYGEN SATURATION: 100 % | RESPIRATION RATE: 18 BRPM | SYSTOLIC BLOOD PRESSURE: 112 MMHG | WEIGHT: 146.16 LBS | DIASTOLIC BLOOD PRESSURE: 78 MMHG | BODY MASS INDEX: 22.94 KG/M2 | TEMPERATURE: 98.2 F

## 2025-03-21 DIAGNOSIS — R10.9 ACUTE LEFT FLANK PAIN: Primary | ICD-10-CM

## 2025-03-21 LAB
ALBUMIN SERPL-MCNC: 4.1 G/DL (ref 3.5–5)
ALBUMIN/GLOB SERPL: 1 (ref 1.1–2.2)
ALP SERPL-CCNC: 68 U/L (ref 45–117)
ALT SERPL-CCNC: 19 U/L (ref 12–78)
ANION GAP SERPL CALC-SCNC: 8 MMOL/L (ref 2–12)
APPEARANCE UR: CLEAR
AST SERPL-CCNC: 22 U/L (ref 15–37)
BACTERIA URNS QL MICRO: NEGATIVE /HPF
BASOPHILS # BLD: 0.03 K/UL (ref 0–0.1)
BASOPHILS NFR BLD: 0.4 % (ref 0–1)
BILIRUB SERPL-MCNC: 0.7 MG/DL (ref 0.2–1)
BILIRUB UR QL: NEGATIVE
BUN SERPL-MCNC: 14 MG/DL (ref 6–20)
BUN/CREAT SERPL: 18 (ref 12–20)
CALCIUM SERPL-MCNC: 9.6 MG/DL (ref 8.5–10.1)
CHLORIDE SERPL-SCNC: 104 MMOL/L (ref 97–108)
CO2 SERPL-SCNC: 26 MMOL/L (ref 21–32)
COLOR UR: ABNORMAL
COMMENT:: NORMAL
CREAT SERPL-MCNC: 0.78 MG/DL (ref 0.55–1.02)
D DIMER PPP FEU-MCNC: 0.3 MG/L FEU (ref 0–0.65)
DIFFERENTIAL METHOD BLD: NORMAL
EKG ATRIAL RATE: 72 BPM
EKG DIAGNOSIS: NORMAL
EKG P AXIS: 68 DEGREES
EKG P-R INTERVAL: 146 MS
EKG Q-T INTERVAL: 382 MS
EKG QRS DURATION: 74 MS
EKG QTC CALCULATION (BAZETT): 418 MS
EKG R AXIS: 72 DEGREES
EKG T AXIS: 44 DEGREES
EKG VENTRICULAR RATE: 72 BPM
EOSINOPHIL # BLD: 0.02 K/UL (ref 0–0.4)
EOSINOPHIL NFR BLD: 0.3 % (ref 0–7)
EPITH CASTS URNS QL MICRO: ABNORMAL /LPF
ERYTHROCYTE [DISTWIDTH] IN BLOOD BY AUTOMATED COUNT: 12.1 % (ref 11.5–14.5)
GLOBULIN SER CALC-MCNC: 4 G/DL (ref 2–4)
GLUCOSE SERPL-MCNC: 82 MG/DL (ref 65–100)
GLUCOSE UR STRIP.AUTO-MCNC: NEGATIVE MG/DL
HCG, URINE, POC: NEGATIVE
HCT VFR BLD AUTO: 36.6 % (ref 35–47)
HGB BLD-MCNC: 12.6 G/DL (ref 11.5–16)
HGB UR QL STRIP: NEGATIVE
HYALINE CASTS URNS QL MICRO: ABNORMAL /LPF (ref 0–5)
IMM GRANULOCYTES # BLD AUTO: 0.02 K/UL (ref 0–0.04)
IMM GRANULOCYTES NFR BLD AUTO: 0.3 % (ref 0–0.5)
KETONES UR QL STRIP.AUTO: 15 MG/DL
LEUKOCYTE ESTERASE UR QL STRIP.AUTO: NEGATIVE
LIPASE SERPL-CCNC: 19 U/L (ref 13–75)
LYMPHOCYTES # BLD: 2.27 K/UL (ref 0.8–3.5)
LYMPHOCYTES NFR BLD: 33.3 % (ref 12–49)
Lab: NORMAL
MCH RBC QN AUTO: 30.2 PG (ref 26–34)
MCHC RBC AUTO-ENTMCNC: 34.4 G/DL (ref 30–36.5)
MCV RBC AUTO: 87.8 FL (ref 80–99)
MONOCYTES # BLD: 0.45 K/UL (ref 0–1)
MONOCYTES NFR BLD: 6.6 % (ref 5–13)
NEGATIVE QC PASS/FAIL: NORMAL
NEUTS SEG # BLD: 4.03 K/UL (ref 1.8–8)
NEUTS SEG NFR BLD: 59.1 % (ref 32–75)
NITRITE UR QL STRIP.AUTO: NEGATIVE
NRBC # BLD: 0 K/UL (ref 0–0.01)
NRBC BLD-RTO: 0 PER 100 WBC
PH UR STRIP: 6 (ref 5–8)
PLATELET # BLD AUTO: 229 K/UL (ref 150–400)
PMV BLD AUTO: 10.7 FL (ref 8.9–12.9)
POSITIVE QC PASS/FAIL: NORMAL
POTASSIUM SERPL-SCNC: 3.6 MMOL/L (ref 3.5–5.1)
PROT SERPL-MCNC: 8.1 G/DL (ref 6.4–8.2)
PROT UR STRIP-MCNC: NEGATIVE MG/DL
RBC # BLD AUTO: 4.17 M/UL (ref 3.8–5.2)
RBC #/AREA URNS HPF: ABNORMAL /HPF (ref 0–5)
SODIUM SERPL-SCNC: 138 MMOL/L (ref 136–145)
SP GR UR REFRACTOMETRY: 1.02 (ref 1–1.03)
SPECIMEN HOLD: NORMAL
TROPONIN I SERPL HS-MCNC: <4 NG/L (ref 0–51)
URINE CULTURE IF INDICATED: ABNORMAL
UROBILINOGEN UR QL STRIP.AUTO: 0.2 EU/DL (ref 0.2–1)
WBC # BLD AUTO: 6.8 K/UL (ref 3.6–11)
WBC URNS QL MICRO: ABNORMAL /HPF (ref 0–4)

## 2025-03-21 PROCEDURE — 93010 ELECTROCARDIOGRAM REPORT: CPT | Performed by: INTERNAL MEDICINE

## 2025-03-21 PROCEDURE — 74177 CT ABD & PELVIS W/CONTRAST: CPT

## 2025-03-21 PROCEDURE — 6360000004 HC RX CONTRAST MEDICATION: Performed by: EMERGENCY MEDICINE

## 2025-03-21 PROCEDURE — 83690 ASSAY OF LIPASE: CPT

## 2025-03-21 PROCEDURE — 99285 EMERGENCY DEPT VISIT HI MDM: CPT

## 2025-03-21 PROCEDURE — 2580000003 HC RX 258: Performed by: EMERGENCY MEDICINE

## 2025-03-21 PROCEDURE — 85379 FIBRIN DEGRADATION QUANT: CPT

## 2025-03-21 PROCEDURE — 81001 URINALYSIS AUTO W/SCOPE: CPT

## 2025-03-21 PROCEDURE — 80053 COMPREHEN METABOLIC PANEL: CPT

## 2025-03-21 PROCEDURE — 93005 ELECTROCARDIOGRAM TRACING: CPT | Performed by: EMERGENCY MEDICINE

## 2025-03-21 PROCEDURE — 85025 COMPLETE CBC W/AUTO DIFF WBC: CPT

## 2025-03-21 PROCEDURE — 84484 ASSAY OF TROPONIN QUANT: CPT

## 2025-03-21 RX ORDER — 0.9 % SODIUM CHLORIDE 0.9 %
1000 INTRAVENOUS SOLUTION INTRAVENOUS ONCE
Status: COMPLETED | OUTPATIENT
Start: 2025-03-21 | End: 2025-03-21

## 2025-03-21 RX ORDER — IOPAMIDOL 755 MG/ML
100 INJECTION, SOLUTION INTRAVASCULAR
Status: COMPLETED | OUTPATIENT
Start: 2025-03-21 | End: 2025-03-21

## 2025-03-21 RX ADMIN — IOPAMIDOL 100 ML: 755 INJECTION, SOLUTION INTRAVENOUS at 04:01

## 2025-03-21 RX ADMIN — SODIUM CHLORIDE 1000 ML: 0.9 INJECTION, SOLUTION INTRAVENOUS at 03:39

## 2025-03-21 ASSESSMENT — LIFESTYLE VARIABLES
HOW OFTEN DO YOU HAVE A DRINK CONTAINING ALCOHOL: NEVER
HOW MANY STANDARD DRINKS CONTAINING ALCOHOL DO YOU HAVE ON A TYPICAL DAY: PATIENT DOES NOT DRINK

## 2025-03-21 ASSESSMENT — PAIN DESCRIPTION - ORIENTATION: ORIENTATION: LEFT

## 2025-03-21 ASSESSMENT — PAIN SCALES - GENERAL
PAINLEVEL_OUTOF10: 0
PAINLEVEL_OUTOF10: 7

## 2025-03-21 ASSESSMENT — PAIN DESCRIPTION - LOCATION: LOCATION: RIB CAGE;FLANK

## 2025-03-21 ASSESSMENT — PAIN - FUNCTIONAL ASSESSMENT
PAIN_FUNCTIONAL_ASSESSMENT: ACTIVITIES ARE NOT PREVENTED
PAIN_FUNCTIONAL_ASSESSMENT: 0-10
PAIN_FUNCTIONAL_ASSESSMENT: NONE - DENIES PAIN

## 2025-03-21 ASSESSMENT — ENCOUNTER SYMPTOMS: ABDOMINAL PAIN: 1

## 2025-03-21 ASSESSMENT — PAIN DESCRIPTION - DESCRIPTORS: DESCRIPTORS: ACHING;DISCOMFORT

## 2025-03-21 NOTE — ED PROVIDER NOTES
[BN]   0434 HCG, Urine, POC: Negative [BN]   0435 Troponin, High Sensitivity: <4 [BN]   0435 WBC: 6.8 [BN]   0435 Hemoglobin Quant: 12.6 [BN]   0441 Patient evaluated for severe left flank pain.  Lab, urinalysis, CT imaging overall unremarkable.  Do not think there is an acute emergency condition identified.  Regarding slurring of speech in triage, patient reports functional neurologic disorder and has the symptoms accompanied by pain and migraines..  On reassessment her slurring has improved a lot, do not suspect central nervous system lesion [BN]      ED Course User Index  [BN] Eugenio Combs MD         CONSULTS:  None    PROCEDURES:     Procedures        (Please note that portions of this note were completed with a voice recognition program.  Efforts were made to edit the dictations but occasionally words are mis-transcribed.)    Eugenio Combs MD (electronically signed)  Emergency Attending Physician              Eugenio Combs MD  03/21/25 [id number]

## 2025-03-21 NOTE — ED NOTES
Introduced self to patient and informed regarding waiting time of results. Placed to monitor and call bell within reach

## 2025-03-21 NOTE — ED TRIAGE NOTES
Pt ambulated to triage w/ c/o \"left side pain and back pain\".  Pt denies falls or injury- states her sx's started yesterday but got worse @ work tonight. Also endorses cp.   States she went to [facility] before coming here but left before tx was complete.  Was given Toradol and Ativan there.  Pt noted to have sluggish and slurred speech during triage in which she states started on her drive here .Pt says she gets migraines and has been dx'd with  Functional neurological disorder but denies currently having a MALONE.  Dr Combs in triage w/ RN